# Patient Record
Sex: FEMALE | Race: WHITE | Employment: UNEMPLOYED | URBAN - NONMETROPOLITAN AREA
[De-identification: names, ages, dates, MRNs, and addresses within clinical notes are randomized per-mention and may not be internally consistent; named-entity substitution may affect disease eponyms.]

---

## 2019-10-30 ENCOUNTER — HOSPITAL ENCOUNTER (EMERGENCY)
Age: 29
Discharge: HOME | End: 2019-10-30
Attending: EMERGENCY MEDICINE
Payer: MEDICARE

## 2019-10-30 ENCOUNTER — APPOINTMENT (OUTPATIENT)
Dept: CT IMAGING | Age: 29
End: 2019-10-30
Payer: MEDICARE

## 2019-10-30 VITALS
HEART RATE: 63 BPM | WEIGHT: 293 LBS | BODY MASS INDEX: 45.99 KG/M2 | HEIGHT: 67 IN | TEMPERATURE: 99.1 F | SYSTOLIC BLOOD PRESSURE: 160 MMHG | DIASTOLIC BLOOD PRESSURE: 70 MMHG | RESPIRATION RATE: 16 BRPM | OXYGEN SATURATION: 96 %

## 2019-10-30 DIAGNOSIS — R11.2 NON-INTRACTABLE VOMITING WITH NAUSEA, UNSPECIFIED VOMITING TYPE: ICD-10-CM

## 2019-10-30 DIAGNOSIS — R10.9 ABDOMINAL PAIN, UNSPECIFIED ABDOMINAL LOCATION: Primary | ICD-10-CM

## 2019-10-30 LAB
-: ABNORMAL
ABSOLUTE EOS #: 0.1 K/UL (ref 0–0.4)
ABSOLUTE IMMATURE GRANULOCYTE: ABNORMAL K/UL (ref 0–0.3)
ABSOLUTE LYMPH #: 1.3 K/UL (ref 1–4.8)
ABSOLUTE MONO #: 1.3 K/UL (ref 0.1–1.2)
ALBUMIN SERPL-MCNC: 4 G/DL (ref 3.5–5.2)
ALBUMIN/GLOBULIN RATIO: 1.2 (ref 1–2.5)
ALP BLD-CCNC: 65 U/L (ref 35–104)
ALT SERPL-CCNC: <5 U/L (ref 5–33)
AMORPHOUS: ABNORMAL
ANION GAP SERPL CALCULATED.3IONS-SCNC: 11 MMOL/L (ref 9–17)
AST SERPL-CCNC: 11 U/L
BACTERIA: ABNORMAL
BASOPHILS # BLD: 0 % (ref 0–1)
BASOPHILS ABSOLUTE: 0 K/UL (ref 0–0.2)
BILIRUB SERPL-MCNC: 0.23 MG/DL (ref 0.3–1.2)
BILIRUBIN URINE: NEGATIVE
BUN BLDV-MCNC: 15 MG/DL (ref 6–20)
BUN/CREAT BLD: 25 (ref 9–20)
CALCIUM SERPL-MCNC: 9.3 MG/DL (ref 8.6–10.4)
CASTS UA: ABNORMAL /LPF (ref 0–2)
CHLORIDE BLD-SCNC: 102 MMOL/L (ref 98–107)
CO2: 28 MMOL/L (ref 20–31)
COLOR: ABNORMAL
COMMENT UA: ABNORMAL
CREAT SERPL-MCNC: 0.59 MG/DL (ref 0.5–0.9)
CRYSTALS, UA: ABNORMAL /HPF
DIFFERENTIAL TYPE: ABNORMAL
EOSINOPHILS RELATIVE PERCENT: 0 % (ref 1–7)
EPITHELIAL CELLS UA: >50 /HPF (ref 0–5)
GFR AFRICAN AMERICAN: >60 ML/MIN
GFR NON-AFRICAN AMERICAN: >60 ML/MIN
GFR SERPL CREATININE-BSD FRML MDRD: ABNORMAL ML/MIN/{1.73_M2}
GFR SERPL CREATININE-BSD FRML MDRD: ABNORMAL ML/MIN/{1.73_M2}
GLUCOSE BLD-MCNC: 124 MG/DL (ref 70–99)
GLUCOSE URINE: NEGATIVE
HCG(URINE) PREGNANCY TEST: NEGATIVE
HCT VFR BLD CALC: 28.8 % (ref 36–46)
HEMOGLOBIN: 9 G/DL (ref 12–16)
IMMATURE GRANULOCYTES: ABNORMAL %
KETONES, URINE: NEGATIVE
LEUKOCYTE ESTERASE, URINE: ABNORMAL
LIPASE: 34 U/L (ref 13–60)
LYMPHOCYTES # BLD: 8 % (ref 16–46)
MCH RBC QN AUTO: 25.3 PG (ref 26–34)
MCHC RBC AUTO-ENTMCNC: 31.5 G/DL (ref 31–37)
MCV RBC AUTO: 80.3 FL (ref 80–100)
MONOCYTES # BLD: 8 % (ref 4–11)
MUCUS: ABNORMAL
NITRITE, URINE: NEGATIVE
NRBC AUTOMATED: ABNORMAL PER 100 WBC
OTHER OBSERVATIONS UA: ABNORMAL
PDW BLD-RTO: 16.9 % (ref 11–14.5)
PH UA: 5.5 (ref 5–6)
PLATELET # BLD: 468 K/UL (ref 140–450)
PLATELET ESTIMATE: ABNORMAL
PMV BLD AUTO: 7.8 FL (ref 6–12)
POTASSIUM SERPL-SCNC: 4.1 MMOL/L (ref 3.7–5.3)
PROTEIN UA: NEGATIVE
RBC # BLD: 3.58 M/UL (ref 4–5.2)
RBC # BLD: ABNORMAL 10*6/UL
RBC UA: ABNORMAL /HPF (ref 0–4)
RENAL EPITHELIAL, UA: ABNORMAL /HPF
SEG NEUTROPHILS: 84 % (ref 43–77)
SEGMENTED NEUTROPHILS ABSOLUTE COUNT: 13.3 K/UL (ref 1.8–7.7)
SODIUM BLD-SCNC: 141 MMOL/L (ref 135–144)
SPECIFIC GRAVITY UA: 1.03 (ref 1.01–1.02)
TOTAL PROTEIN: 7.4 G/DL (ref 6.4–8.3)
TRICHOMONAS: ABNORMAL
TURBIDITY: ABNORMAL
URINE HGB: NEGATIVE
UROBILINOGEN, URINE: NORMAL
WBC # BLD: 16 K/UL (ref 3.5–11)
WBC # BLD: ABNORMAL 10*3/UL
WBC UA: ABNORMAL /HPF (ref 0–4)
YEAST: ABNORMAL

## 2019-10-30 PROCEDURE — 99284 EMERGENCY DEPT VISIT MOD MDM: CPT

## 2019-10-30 PROCEDURE — 6360000004 HC RX CONTRAST MEDICATION: Performed by: EMERGENCY MEDICINE

## 2019-10-30 PROCEDURE — 81001 URINALYSIS AUTO W/SCOPE: CPT

## 2019-10-30 PROCEDURE — 85025 COMPLETE CBC W/AUTO DIFF WBC: CPT

## 2019-10-30 PROCEDURE — 6360000002 HC RX W HCPCS: Performed by: EMERGENCY MEDICINE

## 2019-10-30 PROCEDURE — 96374 THER/PROPH/DIAG INJ IV PUSH: CPT

## 2019-10-30 PROCEDURE — 74177 CT ABD & PELVIS W/CONTRAST: CPT

## 2019-10-30 PROCEDURE — 96375 TX/PRO/DX INJ NEW DRUG ADDON: CPT

## 2019-10-30 PROCEDURE — 83690 ASSAY OF LIPASE: CPT

## 2019-10-30 PROCEDURE — 2580000003 HC RX 258: Performed by: EMERGENCY MEDICINE

## 2019-10-30 PROCEDURE — 6370000000 HC RX 637 (ALT 250 FOR IP): Performed by: EMERGENCY MEDICINE

## 2019-10-30 PROCEDURE — 81025 URINE PREGNANCY TEST: CPT

## 2019-10-30 PROCEDURE — 80053 COMPREHEN METABOLIC PANEL: CPT

## 2019-10-30 RX ORDER — ONDANSETRON 4 MG/1
4 TABLET, ORALLY DISINTEGRATING ORAL ONCE
Status: COMPLETED | OUTPATIENT
Start: 2019-10-30 | End: 2019-10-30

## 2019-10-30 RX ORDER — ONDANSETRON 2 MG/ML
4 INJECTION INTRAMUSCULAR; INTRAVENOUS ONCE
Status: COMPLETED | OUTPATIENT
Start: 2019-10-30 | End: 2019-10-30

## 2019-10-30 RX ORDER — ONDANSETRON 4 MG/1
4 TABLET, ORALLY DISINTEGRATING ORAL EVERY 8 HOURS PRN
Qty: 12 TABLET | Refills: 0 | Status: ON HOLD | OUTPATIENT
Start: 2019-10-30 | End: 2020-01-09 | Stop reason: HOSPADM

## 2019-10-30 RX ORDER — 0.9 % SODIUM CHLORIDE 0.9 %
1000 INTRAVENOUS SOLUTION INTRAVENOUS ONCE
Status: COMPLETED | OUTPATIENT
Start: 2019-10-30 | End: 2019-10-30

## 2019-10-30 RX ORDER — KETOROLAC TROMETHAMINE 30 MG/ML
15 INJECTION, SOLUTION INTRAMUSCULAR; INTRAVENOUS ONCE
Status: COMPLETED | OUTPATIENT
Start: 2019-10-30 | End: 2019-10-30

## 2019-10-30 RX ADMIN — IOPAMIDOL 100 ML: 755 INJECTION, SOLUTION INTRAVENOUS at 22:43

## 2019-10-30 RX ADMIN — SODIUM CHLORIDE 1000 ML: 9 INJECTION, SOLUTION INTRAVENOUS at 21:42

## 2019-10-30 RX ADMIN — ONDANSETRON 4 MG: 4 TABLET, ORALLY DISINTEGRATING ORAL at 21:16

## 2019-10-30 RX ADMIN — KETOROLAC TROMETHAMINE 15 MG: 30 INJECTION, SOLUTION INTRAMUSCULAR at 21:51

## 2019-10-30 RX ADMIN — ONDANSETRON 4 MG: 2 INJECTION INTRAMUSCULAR; INTRAVENOUS at 21:50

## 2019-10-30 ASSESSMENT — PAIN DESCRIPTION - FREQUENCY
FREQUENCY: CONTINUOUS
FREQUENCY: INTERMITTENT

## 2019-10-30 ASSESSMENT — PAIN DESCRIPTION - LOCATION
LOCATION: ABDOMEN
LOCATION: ABDOMEN

## 2019-10-30 ASSESSMENT — PAIN DESCRIPTION - PAIN TYPE
TYPE: ACUTE PAIN
TYPE: ACUTE PAIN

## 2019-10-30 ASSESSMENT — PAIN SCALES - GENERAL
PAINLEVEL_OUTOF10: 8
PAINLEVEL_OUTOF10: 5
PAINLEVEL_OUTOF10: 0

## 2019-10-30 ASSESSMENT — PAIN DESCRIPTION - ORIENTATION
ORIENTATION: UPPER
ORIENTATION: MID;UPPER

## 2019-10-30 ASSESSMENT — PAIN DESCRIPTION - DESCRIPTORS
DESCRIPTORS: CRAMPING
DESCRIPTORS: CRAMPING

## 2019-10-31 ASSESSMENT — ENCOUNTER SYMPTOMS
DIARRHEA: 0
SHORTNESS OF BREATH: 0
ABDOMINAL PAIN: 1
NAUSEA: 1
CONSTIPATION: 0
BLOOD IN STOOL: 0
COUGH: 0
BACK PAIN: 0
VOMITING: 1

## 2019-12-09 ENCOUNTER — APPOINTMENT (OUTPATIENT)
Dept: CT IMAGING | Age: 29
End: 2019-12-09
Payer: MEDICARE

## 2019-12-09 ENCOUNTER — HOSPITAL ENCOUNTER (EMERGENCY)
Age: 29
Discharge: HOME | End: 2019-12-09
Attending: EMERGENCY MEDICINE
Payer: MEDICARE

## 2019-12-09 VITALS
WEIGHT: 293 LBS | OXYGEN SATURATION: 96 % | RESPIRATION RATE: 16 BRPM | SYSTOLIC BLOOD PRESSURE: 140 MMHG | BODY MASS INDEX: 45.99 KG/M2 | DIASTOLIC BLOOD PRESSURE: 80 MMHG | HEART RATE: 90 BPM | TEMPERATURE: 99 F | HEIGHT: 67 IN

## 2019-12-09 DIAGNOSIS — R10.84 GENERALIZED ABDOMINAL PAIN: Primary | ICD-10-CM

## 2019-12-09 LAB
-: ABNORMAL
ABSOLUTE EOS #: 0.16 K/UL (ref 0–0.4)
ABSOLUTE IMMATURE GRANULOCYTE: ABNORMAL K/UL (ref 0–0.3)
ABSOLUTE LYMPH #: 0.95 K/UL (ref 1–4.8)
ABSOLUTE MONO #: 0.95 K/UL (ref 0.1–1.2)
ALBUMIN SERPL-MCNC: 3.9 G/DL (ref 3.5–5.2)
ALBUMIN/GLOBULIN RATIO: 1.4 (ref 1–2.5)
ALP BLD-CCNC: 69 U/L (ref 35–104)
ALT SERPL-CCNC: 20 U/L (ref 5–33)
AMORPHOUS: ABNORMAL
ANION GAP SERPL CALCULATED.3IONS-SCNC: 14 MMOL/L (ref 9–17)
AST SERPL-CCNC: 14 U/L
BACTERIA: ABNORMAL
BASOPHILS # BLD: 0 % (ref 0–1)
BASOPHILS ABSOLUTE: 0 K/UL (ref 0–0.2)
BILIRUB SERPL-MCNC: 0.14 MG/DL (ref 0.3–1.2)
BILIRUBIN URINE: NEGATIVE
BUN BLDV-MCNC: 10 MG/DL (ref 6–20)
BUN/CREAT BLD: 20 (ref 9–20)
CALCIUM SERPL-MCNC: 9 MG/DL (ref 8.6–10.4)
CASTS UA: ABNORMAL /LPF (ref 0–2)
CHLORIDE BLD-SCNC: 97 MMOL/L (ref 98–107)
CO2: 31 MMOL/L (ref 20–31)
COLOR: ABNORMAL
COMMENT UA: ABNORMAL
CREAT SERPL-MCNC: 0.5 MG/DL (ref 0.5–0.9)
CRYSTALS, UA: ABNORMAL /HPF
DIFFERENTIAL TYPE: ABNORMAL
DIRECT EXAM: NORMAL
DIRECT EXAM: NORMAL
EOSINOPHILS RELATIVE PERCENT: 1 % (ref 1–7)
EPITHELIAL CELLS UA: ABNORMAL /HPF (ref 0–5)
GFR AFRICAN AMERICAN: >60 ML/MIN
GFR NON-AFRICAN AMERICAN: >60 ML/MIN
GFR SERPL CREATININE-BSD FRML MDRD: ABNORMAL ML/MIN/{1.73_M2}
GFR SERPL CREATININE-BSD FRML MDRD: ABNORMAL ML/MIN/{1.73_M2}
GLUCOSE BLD-MCNC: 139 MG/DL (ref 70–99)
GLUCOSE URINE: NEGATIVE
HCG QUALITATIVE: NEGATIVE
HCT VFR BLD CALC: 36.1 % (ref 36–46)
HEMOGLOBIN: 10.8 G/DL (ref 12–16)
IMMATURE GRANULOCYTES: ABNORMAL %
KETONES, URINE: NEGATIVE
LEUKOCYTE ESTERASE, URINE: ABNORMAL
LIPASE: 70 U/L (ref 13–60)
LYMPHOCYTES # BLD: 6 % (ref 16–46)
Lab: NORMAL
Lab: NORMAL
MCH RBC QN AUTO: 21.9 PG (ref 26–34)
MCHC RBC AUTO-ENTMCNC: 30 G/DL (ref 31–37)
MCV RBC AUTO: 72.9 FL (ref 80–100)
MONOCYTES # BLD: 6 % (ref 4–11)
MORPHOLOGY: ABNORMAL
MUCUS: ABNORMAL
NITRITE, URINE: NEGATIVE
NRBC AUTOMATED: ABNORMAL PER 100 WBC
OTHER OBSERVATIONS UA: ABNORMAL
PDW BLD-RTO: 19.9 % (ref 11–14.5)
PH UA: 7.5 (ref 5–6)
PLATELET # BLD: 398 K/UL (ref 140–450)
PLATELET ESTIMATE: ABNORMAL
PMV BLD AUTO: 8.2 FL (ref 6–12)
POTASSIUM SERPL-SCNC: 3.3 MMOL/L (ref 3.7–5.3)
PROTEIN UA: NEGATIVE
RBC # BLD: 4.96 M/UL (ref 4–5.2)
RBC # BLD: ABNORMAL 10*6/UL
RBC UA: ABNORMAL /HPF (ref 0–4)
RENAL EPITHELIAL, UA: ABNORMAL /HPF
SEG NEUTROPHILS: 87 % (ref 43–77)
SEGMENTED NEUTROPHILS ABSOLUTE COUNT: 13.74 K/UL (ref 1.8–7.7)
SODIUM BLD-SCNC: 142 MMOL/L (ref 135–144)
SPECIFIC GRAVITY UA: 1.02 (ref 1.01–1.02)
SPECIMEN DESCRIPTION: NORMAL
SPECIMEN DESCRIPTION: NORMAL
TOTAL PROTEIN: 6.7 G/DL (ref 6.4–8.3)
TRICHOMONAS: ABNORMAL
TURBIDITY: ABNORMAL
URINE HGB: NEGATIVE
UROBILINOGEN, URINE: NORMAL
WBC # BLD: 15.8 K/UL (ref 3.5–11)
WBC # BLD: ABNORMAL 10*3/UL
WBC UA: ABNORMAL /HPF (ref 0–4)
YEAST: ABNORMAL

## 2019-12-09 PROCEDURE — 84703 CHORIONIC GONADOTROPIN ASSAY: CPT

## 2019-12-09 PROCEDURE — 85025 COMPLETE CBC W/AUTO DIFF WBC: CPT

## 2019-12-09 PROCEDURE — 74176 CT ABD & PELVIS W/O CONTRAST: CPT

## 2019-12-09 PROCEDURE — 87491 CHLMYD TRACH DNA AMP PROBE: CPT

## 2019-12-09 PROCEDURE — 83690 ASSAY OF LIPASE: CPT

## 2019-12-09 PROCEDURE — 87591 N.GONORRHOEAE DNA AMP PROB: CPT

## 2019-12-09 PROCEDURE — 87086 URINE CULTURE/COLONY COUNT: CPT

## 2019-12-09 PROCEDURE — 87210 SMEAR WET MOUNT SALINE/INK: CPT

## 2019-12-09 PROCEDURE — 80053 COMPREHEN METABOLIC PANEL: CPT

## 2019-12-09 PROCEDURE — 81001 URINALYSIS AUTO W/SCOPE: CPT

## 2019-12-09 PROCEDURE — 6360000002 HC RX W HCPCS: Performed by: EMERGENCY MEDICINE

## 2019-12-09 PROCEDURE — 96374 THER/PROPH/DIAG INJ IV PUSH: CPT

## 2019-12-09 PROCEDURE — 99284 EMERGENCY DEPT VISIT MOD MDM: CPT

## 2019-12-09 RX ORDER — PNV NO.95/FERROUS FUM/FOLIC AC 28MG-0.8MG
325 TABLET ORAL
COMMUNITY
Start: 2019-11-26 | End: 2020-01-06

## 2019-12-09 RX ORDER — AMLODIPINE BESYLATE 5 MG/1
5 TABLET ORAL
Status: ON HOLD | COMMUNITY
Start: 2019-12-02 | End: 2020-01-09 | Stop reason: HOSPADM

## 2019-12-09 RX ORDER — HYDROCHLOROTHIAZIDE 12.5 MG/1
12.5 CAPSULE, GELATIN COATED ORAL
Status: ON HOLD | COMMUNITY
Start: 2019-11-19 | End: 2020-01-09 | Stop reason: HOSPADM

## 2019-12-09 RX ORDER — CYCLOBENZAPRINE HCL 10 MG
10 TABLET ORAL 3 TIMES DAILY PRN
COMMUNITY

## 2019-12-09 RX ORDER — KETOROLAC TROMETHAMINE 30 MG/ML
30 INJECTION, SOLUTION INTRAMUSCULAR; INTRAVENOUS ONCE
Status: COMPLETED | OUTPATIENT
Start: 2019-12-09 | End: 2019-12-09

## 2019-12-09 RX ORDER — LOSARTAN POTASSIUM 100 MG/1
100 TABLET ORAL
COMMUNITY
Start: 2019-11-26

## 2019-12-09 RX ADMIN — KETOROLAC TROMETHAMINE 30 MG: 30 INJECTION, SOLUTION INTRAMUSCULAR at 20:47

## 2019-12-09 ASSESSMENT — PAIN SCALES - GENERAL
PAINLEVEL_OUTOF10: 9
PAINLEVEL_OUTOF10: 5
PAINLEVEL_OUTOF10: 9

## 2019-12-09 ASSESSMENT — PAIN DESCRIPTION - DESCRIPTORS: DESCRIPTORS: BURNING;CRAMPING;SHARP

## 2019-12-09 ASSESSMENT — ENCOUNTER SYMPTOMS
SHORTNESS OF BREATH: 0
NAUSEA: 0
DIARRHEA: 0
BACK PAIN: 0
EYE PAIN: 0
COUGH: 0
ABDOMINAL PAIN: 1
VOMITING: 0

## 2019-12-09 ASSESSMENT — PAIN DESCRIPTION - ONSET: ONSET: ON-GOING

## 2019-12-09 ASSESSMENT — PAIN DESCRIPTION - LOCATION: LOCATION: ABDOMEN

## 2019-12-09 ASSESSMENT — PAIN DESCRIPTION - ORIENTATION: ORIENTATION: LOWER

## 2019-12-09 ASSESSMENT — PAIN - FUNCTIONAL ASSESSMENT: PAIN_FUNCTIONAL_ASSESSMENT: 0-10

## 2019-12-09 ASSESSMENT — PAIN DESCRIPTION - PAIN TYPE: TYPE: CHRONIC PAIN

## 2019-12-09 ASSESSMENT — PAIN DESCRIPTION - PROGRESSION: CLINICAL_PROGRESSION: NOT CHANGED

## 2019-12-09 ASSESSMENT — PAIN DESCRIPTION - FREQUENCY: FREQUENCY: INTERMITTENT

## 2019-12-11 LAB
C TRACH DNA GENITAL QL NAA+PROBE: NEGATIVE
N. GONORRHOEAE DNA: NEGATIVE
SPECIMEN DESCRIPTION: NORMAL

## 2019-12-12 LAB
-: NORMAL
REASON FOR REJECTION: NORMAL
ZZ NTE CLEAN UP: ORDERED TEST: NORMAL
ZZ NTE WITH NAME CLEAN UP: SPECIMEN SOURCE: NORMAL

## 2019-12-16 ENCOUNTER — HOSPITAL ENCOUNTER (EMERGENCY)
Age: 29
Discharge: HOME | End: 2019-12-16
Attending: EMERGENCY MEDICINE
Payer: MEDICARE

## 2019-12-16 VITALS
WEIGHT: 293 LBS | HEART RATE: 77 BPM | TEMPERATURE: 98.8 F | HEIGHT: 67 IN | DIASTOLIC BLOOD PRESSURE: 84 MMHG | BODY MASS INDEX: 45.99 KG/M2 | OXYGEN SATURATION: 95 % | SYSTOLIC BLOOD PRESSURE: 166 MMHG | RESPIRATION RATE: 12 BRPM

## 2019-12-16 DIAGNOSIS — E87.6 HYPOKALEMIA: ICD-10-CM

## 2019-12-16 DIAGNOSIS — R11.2 NAUSEA VOMITING AND DIARRHEA: Primary | ICD-10-CM

## 2019-12-16 DIAGNOSIS — E86.0 DEHYDRATION: ICD-10-CM

## 2019-12-16 DIAGNOSIS — R19.7 NAUSEA VOMITING AND DIARRHEA: Primary | ICD-10-CM

## 2019-12-16 LAB
ABSOLUTE EOS #: 0 K/UL (ref 0–0.4)
ABSOLUTE IMMATURE GRANULOCYTE: ABNORMAL K/UL (ref 0–0.3)
ABSOLUTE LYMPH #: 0.4 K/UL (ref 1–4.8)
ABSOLUTE MONO #: 0.4 K/UL (ref 0.1–1.2)
ALBUMIN SERPL-MCNC: 3.4 G/DL (ref 3.5–5.2)
ALBUMIN/GLOBULIN RATIO: 1.3 (ref 1–2.5)
ALP BLD-CCNC: 57 U/L (ref 35–104)
ALT SERPL-CCNC: 19 U/L (ref 5–33)
AMYLASE: 60 U/L (ref 28–100)
ANION GAP SERPL CALCULATED.3IONS-SCNC: 12 MMOL/L (ref 9–17)
AST SERPL-CCNC: 18 U/L
BASOPHILS # BLD: 0 % (ref 0–1)
BASOPHILS ABSOLUTE: 0 K/UL (ref 0–0.2)
BILIRUB SERPL-MCNC: 0.46 MG/DL (ref 0.3–1.2)
BILIRUBIN DIRECT: 0.13 MG/DL
BILIRUBIN, INDIRECT: 0.33 MG/DL (ref 0–1)
BUN BLDV-MCNC: 13 MG/DL (ref 6–20)
BUN/CREAT BLD: 28 (ref 9–20)
CALCIUM SERPL-MCNC: 8.8 MG/DL (ref 8.6–10.4)
CHLORIDE BLD-SCNC: 96 MMOL/L (ref 98–107)
CO2: 34 MMOL/L (ref 20–31)
CREAT SERPL-MCNC: 0.46 MG/DL (ref 0.5–0.9)
DIFFERENTIAL TYPE: ABNORMAL
EOSINOPHILS RELATIVE PERCENT: 0 % (ref 1–7)
GFR AFRICAN AMERICAN: >60 ML/MIN
GFR NON-AFRICAN AMERICAN: >60 ML/MIN
GFR SERPL CREATININE-BSD FRML MDRD: ABNORMAL ML/MIN/{1.73_M2}
GFR SERPL CREATININE-BSD FRML MDRD: ABNORMAL ML/MIN/{1.73_M2}
GLOBULIN: 2.6 G/DL (ref 1.5–3.8)
GLUCOSE BLD-MCNC: 159 MG/DL (ref 70–99)
HCG QUALITATIVE: NEGATIVE
HCT VFR BLD CALC: 35.9 % (ref 36–46)
HEMOGLOBIN: 11 G/DL (ref 12–16)
IMMATURE GRANULOCYTES: ABNORMAL %
LIPASE: 34 U/L (ref 13–60)
LYMPHOCYTES # BLD: 4 % (ref 16–46)
MCH RBC QN AUTO: 22.6 PG (ref 26–34)
MCHC RBC AUTO-ENTMCNC: 30.7 G/DL (ref 31–37)
MCV RBC AUTO: 73.6 FL (ref 80–100)
MONOCYTES # BLD: 4 % (ref 4–11)
MORPHOLOGY: ABNORMAL
MORPHOLOGY: ABNORMAL
NRBC AUTOMATED: ABNORMAL PER 100 WBC
PDW BLD-RTO: 20.7 % (ref 11–14.5)
PLATELET # BLD: 294 K/UL (ref 140–450)
PLATELET ESTIMATE: ABNORMAL
PMV BLD AUTO: 8.2 FL (ref 6–12)
POTASSIUM SERPL-SCNC: 2.9 MMOL/L (ref 3.7–5.3)
RBC # BLD: 4.88 M/UL (ref 4–5.2)
RBC # BLD: ABNORMAL 10*6/UL
SEG NEUTROPHILS: 92 % (ref 43–77)
SEGMENTED NEUTROPHILS ABSOLUTE COUNT: 9.3 K/UL (ref 1.8–7.7)
SODIUM BLD-SCNC: 142 MMOL/L (ref 135–144)
TOTAL PROTEIN: 6 G/DL (ref 6.4–8.3)
WBC # BLD: 10.1 K/UL (ref 3.5–11)
WBC # BLD: ABNORMAL 10*3/UL

## 2019-12-16 PROCEDURE — 80048 BASIC METABOLIC PNL TOTAL CA: CPT

## 2019-12-16 PROCEDURE — 80076 HEPATIC FUNCTION PANEL: CPT

## 2019-12-16 PROCEDURE — 99284 EMERGENCY DEPT VISIT MOD MDM: CPT

## 2019-12-16 PROCEDURE — 2580000003 HC RX 258: Performed by: EMERGENCY MEDICINE

## 2019-12-16 PROCEDURE — 84703 CHORIONIC GONADOTROPIN ASSAY: CPT

## 2019-12-16 PROCEDURE — 96361 HYDRATE IV INFUSION ADD-ON: CPT

## 2019-12-16 PROCEDURE — 36415 COLL VENOUS BLD VENIPUNCTURE: CPT

## 2019-12-16 PROCEDURE — 85025 COMPLETE CBC W/AUTO DIFF WBC: CPT

## 2019-12-16 PROCEDURE — 82150 ASSAY OF AMYLASE: CPT

## 2019-12-16 PROCEDURE — 83690 ASSAY OF LIPASE: CPT

## 2019-12-16 PROCEDURE — 96374 THER/PROPH/DIAG INJ IV PUSH: CPT

## 2019-12-16 PROCEDURE — 6360000002 HC RX W HCPCS: Performed by: EMERGENCY MEDICINE

## 2019-12-16 RX ORDER — 0.9 % SODIUM CHLORIDE 0.9 %
1000 INTRAVENOUS SOLUTION INTRAVENOUS ONCE
Status: COMPLETED | OUTPATIENT
Start: 2019-12-16 | End: 2019-12-16

## 2019-12-16 RX ORDER — POTASSIUM CHLORIDE 20 MEQ/1
20 TABLET, EXTENDED RELEASE ORAL DAILY
Qty: 10 TABLET | Refills: 0 | Status: SHIPPED | OUTPATIENT
Start: 2019-12-16

## 2019-12-16 RX ORDER — ONDANSETRON 2 MG/ML
4 INJECTION INTRAMUSCULAR; INTRAVENOUS ONCE
Status: COMPLETED | OUTPATIENT
Start: 2019-12-16 | End: 2019-12-16

## 2019-12-16 RX ORDER — ONDANSETRON 4 MG/1
4 TABLET, FILM COATED ORAL EVERY 8 HOURS PRN
Qty: 10 TABLET | Refills: 0 | Status: SHIPPED | OUTPATIENT
Start: 2019-12-16

## 2019-12-16 RX ADMIN — ONDANSETRON 4 MG: 2 INJECTION INTRAMUSCULAR; INTRAVENOUS at 17:35

## 2019-12-16 RX ADMIN — SODIUM CHLORIDE 1000 ML: 9 INJECTION, SOLUTION INTRAVENOUS at 17:35

## 2020-01-06 ENCOUNTER — HOSPITAL ENCOUNTER (INPATIENT)
Age: 30
LOS: 3 days | Discharge: HOME | DRG: 689 | End: 2020-01-09
Attending: EMERGENCY MEDICINE | Admitting: FAMILY MEDICINE
Payer: COMMERCIAL

## 2020-01-06 PROBLEM — N39.0 UTI (URINARY TRACT INFECTION): Status: ACTIVE | Noted: 2020-01-06

## 2020-01-06 LAB
-: ABNORMAL
ABSOLUTE EOS #: 0 K/UL (ref 0–0.44)
ABSOLUTE IMMATURE GRANULOCYTE: 0.25 K/UL (ref 0–0.3)
ABSOLUTE LYMPH #: 0.38 K/UL (ref 1.1–3.7)
ABSOLUTE MONO #: 0.04 K/UL (ref 0.1–1.2)
ALBUMIN SERPL-MCNC: 3.7 G/DL (ref 3.5–5.2)
ALBUMIN/GLOBULIN RATIO: 1.6 (ref 1–2.5)
ALP BLD-CCNC: 62 U/L (ref 35–104)
ALT SERPL-CCNC: 52 U/L (ref 5–33)
AMORPHOUS: ABNORMAL
ANION GAP SERPL CALCULATED.3IONS-SCNC: 19 MMOL/L (ref 9–17)
AST SERPL-CCNC: 15 U/L
BACTERIA: ABNORMAL
BASOPHILS # BLD: 1 % (ref 0–2)
BASOPHILS ABSOLUTE: 0.04 K/UL (ref 0–0.2)
BILIRUB SERPL-MCNC: 0.51 MG/DL (ref 0.3–1.2)
BILIRUBIN URINE: NEGATIVE
BUN BLDV-MCNC: 24 MG/DL (ref 6–20)
BUN/CREAT BLD: 38 (ref 9–20)
CALCIUM SERPL-MCNC: 8.7 MG/DL (ref 8.6–10.4)
CASTS UA: ABNORMAL /LPF (ref 0–2)
CHLORIDE BLD-SCNC: 93 MMOL/L (ref 98–107)
CO2: 30 MMOL/L (ref 20–31)
COLOR: ABNORMAL
COMMENT UA: ABNORMAL
CREAT SERPL-MCNC: 0.64 MG/DL (ref 0.5–0.9)
CRYSTALS, UA: ABNORMAL /HPF
DIFFERENTIAL TYPE: ABNORMAL
EKG ATRIAL RATE: 125 BPM
EKG P AXIS: 50 DEGREES
EKG P-R INTERVAL: 130 MS
EKG Q-T INTERVAL: 294 MS
EKG QRS DURATION: 78 MS
EKG QTC CALCULATION (BAZETT): 424 MS
EKG R AXIS: 25 DEGREES
EKG T AXIS: -56 DEGREES
EKG VENTRICULAR RATE: 125 BPM
EOSINOPHILS RELATIVE PERCENT: 0 % (ref 1–4)
EPITHELIAL CELLS UA: ABNORMAL /HPF (ref 0–5)
ESTIMATED AVERAGE GLUCOSE: 166 MG/DL
GFR AFRICAN AMERICAN: >60 ML/MIN
GFR NON-AFRICAN AMERICAN: >60 ML/MIN
GFR SERPL CREATININE-BSD FRML MDRD: ABNORMAL ML/MIN/{1.73_M2}
GFR SERPL CREATININE-BSD FRML MDRD: ABNORMAL ML/MIN/{1.73_M2}
GLUCOSE BLD-MCNC: 210 MG/DL (ref 65–105)
GLUCOSE BLD-MCNC: 230 MG/DL (ref 65–105)
GLUCOSE BLD-MCNC: 310 MG/DL (ref 65–105)
GLUCOSE BLD-MCNC: 346 MG/DL (ref 70–99)
GLUCOSE URINE: ABNORMAL
HBA1C MFR BLD: 7.4 % (ref 4.8–5.9)
HCG QUALITATIVE: NEGATIVE
HCT VFR BLD CALC: 40 % (ref 36.3–47.1)
HEMOGLOBIN: 11.2 G/DL (ref 11.9–15.1)
IMMATURE GRANULOCYTES: 6 %
KETONES, URINE: ABNORMAL
LACTIC ACID, SEPSIS WHOLE BLOOD: ABNORMAL MMOL/L (ref 0.5–1.9)
LACTIC ACID, SEPSIS: 5.6 MMOL/L (ref 0.5–1.9)
LACTIC ACID: 3.9 MMOL/L (ref 0.5–2.2)
LACTIC ACID: 4.6 MMOL/L (ref 0.5–2.2)
LEUKOCYTE ESTERASE, URINE: ABNORMAL
LYMPHOCYTES # BLD: 9 % (ref 24–43)
MCH RBC QN AUTO: 21.7 PG (ref 25.2–33.5)
MCHC RBC AUTO-ENTMCNC: 28 G/DL (ref 25.2–33.5)
MCV RBC AUTO: 77.4 FL (ref 82.6–102.9)
MONOCYTES # BLD: 1 % (ref 3–12)
MORPHOLOGY: ABNORMAL
MUCUS: ABNORMAL
NITRITE, URINE: NEGATIVE
NRBC AUTOMATED: 0 PER 100 WBC
OTHER OBSERVATIONS UA: ABNORMAL
PDW BLD-RTO: 19.3 % (ref 11.8–14.4)
PH UA: 6 (ref 5–6)
PLATELET # BLD: 134 K/UL (ref 138–453)
PLATELET ESTIMATE: ABNORMAL
PMV BLD AUTO: 9.9 FL (ref 8.1–13.5)
POTASSIUM SERPL-SCNC: 3.3 MMOL/L (ref 3.7–5.3)
PROTEIN UA: ABNORMAL
RBC # BLD: 5.17 M/UL (ref 3.95–5.11)
RBC # BLD: ABNORMAL 10*6/UL
RBC UA: ABNORMAL /HPF (ref 0–4)
RENAL EPITHELIAL, UA: ABNORMAL /HPF
SEG NEUTROPHILS: 83 % (ref 36–65)
SEGMENTED NEUTROPHILS ABSOLUTE COUNT: 3.49 K/UL (ref 1.5–8.1)
SODIUM BLD-SCNC: 142 MMOL/L (ref 135–144)
SPECIFIC GRAVITY UA: 1.02 (ref 1.01–1.02)
TOTAL PROTEIN: 6 G/DL (ref 6.4–8.3)
TRICHOMONAS: ABNORMAL
TURBIDITY: ABNORMAL
URINE HGB: ABNORMAL
UROBILINOGEN, URINE: NORMAL
WBC # BLD: 4.2 K/UL (ref 3.5–11.3)
WBC # BLD: ABNORMAL 10*3/UL
WBC UA: ABNORMAL /HPF (ref 0–4)
YEAST: ABNORMAL

## 2020-01-06 PROCEDURE — 87040 BLOOD CULTURE FOR BACTERIA: CPT

## 2020-01-06 PROCEDURE — 99285 EMERGENCY DEPT VISIT HI MDM: CPT

## 2020-01-06 PROCEDURE — 83605 ASSAY OF LACTIC ACID: CPT

## 2020-01-06 PROCEDURE — 85025 COMPLETE CBC W/AUTO DIFF WBC: CPT

## 2020-01-06 PROCEDURE — 2580000003 HC RX 258: Performed by: NURSE PRACTITIONER

## 2020-01-06 PROCEDURE — 6370000000 HC RX 637 (ALT 250 FOR IP): Performed by: EMERGENCY MEDICINE

## 2020-01-06 PROCEDURE — 6360000002 HC RX W HCPCS: Performed by: EMERGENCY MEDICINE

## 2020-01-06 PROCEDURE — 36415 COLL VENOUS BLD VENIPUNCTURE: CPT

## 2020-01-06 PROCEDURE — 93005 ELECTROCARDIOGRAM TRACING: CPT | Performed by: EMERGENCY MEDICINE

## 2020-01-06 PROCEDURE — 2500000003 HC RX 250 WO HCPCS: Performed by: NURSE PRACTITIONER

## 2020-01-06 PROCEDURE — 82947 ASSAY GLUCOSE BLOOD QUANT: CPT

## 2020-01-06 PROCEDURE — 81001 URINALYSIS AUTO W/SCOPE: CPT

## 2020-01-06 PROCEDURE — 80053 COMPREHEN METABOLIC PANEL: CPT

## 2020-01-06 PROCEDURE — 2060000000 HC ICU INTERMEDIATE R&B

## 2020-01-06 PROCEDURE — 96365 THER/PROPH/DIAG IV INF INIT: CPT

## 2020-01-06 PROCEDURE — 6370000000 HC RX 637 (ALT 250 FOR IP): Performed by: NURSE PRACTITIONER

## 2020-01-06 PROCEDURE — 96375 TX/PRO/DX INJ NEW DRUG ADDON: CPT

## 2020-01-06 PROCEDURE — 83036 HEMOGLOBIN GLYCOSYLATED A1C: CPT

## 2020-01-06 PROCEDURE — 2580000003 HC RX 258: Performed by: EMERGENCY MEDICINE

## 2020-01-06 PROCEDURE — 87086 URINE CULTURE/COLONY COUNT: CPT

## 2020-01-06 PROCEDURE — 84703 CHORIONIC GONADOTROPIN ASSAY: CPT

## 2020-01-06 RX ORDER — LORAZEPAM 1 MG/1
1 TABLET ORAL EVERY 6 HOURS PRN
COMMUNITY

## 2020-01-06 RX ORDER — 0.9 % SODIUM CHLORIDE 0.9 %
1000 INTRAVENOUS SOLUTION INTRAVENOUS ONCE
Status: COMPLETED | OUTPATIENT
Start: 2020-01-06 | End: 2020-01-06

## 2020-01-06 RX ORDER — SODIUM CHLORIDE 0.9 % (FLUSH) 0.9 %
10 SYRINGE (ML) INJECTION EVERY 12 HOURS SCHEDULED
Status: DISCONTINUED | OUTPATIENT
Start: 2020-01-06 | End: 2020-01-09 | Stop reason: HOSPADM

## 2020-01-06 RX ORDER — DEXTROSE MONOHYDRATE 25 G/50ML
12.5 INJECTION, SOLUTION INTRAVENOUS PRN
Status: DISCONTINUED | OUTPATIENT
Start: 2020-01-06 | End: 2020-01-09 | Stop reason: HOSPADM

## 2020-01-06 RX ORDER — DEXTROSE MONOHYDRATE 50 MG/ML
100 INJECTION, SOLUTION INTRAVENOUS PRN
Status: DISCONTINUED | OUTPATIENT
Start: 2020-01-06 | End: 2020-01-09 | Stop reason: HOSPADM

## 2020-01-06 RX ORDER — NICOTINE POLACRILEX 4 MG
15 LOZENGE BUCCAL PRN
Status: DISCONTINUED | OUTPATIENT
Start: 2020-01-06 | End: 2020-01-09 | Stop reason: HOSPADM

## 2020-01-06 RX ORDER — AMLODIPINE BESYLATE 5 MG/1
5 TABLET ORAL NIGHTLY
Status: DISCONTINUED | OUTPATIENT
Start: 2020-01-06 | End: 2020-01-09

## 2020-01-06 RX ORDER — ONDANSETRON 2 MG/ML
4 INJECTION INTRAMUSCULAR; INTRAVENOUS ONCE
Status: COMPLETED | OUTPATIENT
Start: 2020-01-06 | End: 2020-01-06

## 2020-01-06 RX ORDER — ACETAMINOPHEN 325 MG/1
650 TABLET ORAL EVERY 4 HOURS PRN
Status: DISCONTINUED | OUTPATIENT
Start: 2020-01-06 | End: 2020-01-09 | Stop reason: HOSPADM

## 2020-01-06 RX ORDER — INSULIN GLARGINE 100 [IU]/ML
20 INJECTION, SOLUTION SUBCUTANEOUS NIGHTLY
Status: DISCONTINUED | OUTPATIENT
Start: 2020-01-06 | End: 2020-01-08

## 2020-01-06 RX ORDER — SPIRONOLACTONE AND HYDROCHLOROTHIAZIDE 25; 25 MG/1; MG/1
1 TABLET ORAL DAILY
COMMUNITY

## 2020-01-06 RX ORDER — SODIUM CHLORIDE 9 MG/ML
INJECTION, SOLUTION INTRAVENOUS CONTINUOUS
Status: DISCONTINUED | OUTPATIENT
Start: 2020-01-06 | End: 2020-01-08

## 2020-01-06 RX ORDER — SODIUM CHLORIDE 0.9 % (FLUSH) 0.9 %
10 SYRINGE (ML) INJECTION PRN
Status: DISCONTINUED | OUTPATIENT
Start: 2020-01-06 | End: 2020-01-09 | Stop reason: HOSPADM

## 2020-01-06 RX ORDER — PROCHLORPERAZINE MALEATE 10 MG
10 TABLET ORAL EVERY 6 HOURS PRN
COMMUNITY

## 2020-01-06 RX ORDER — TRAMADOL HYDROCHLORIDE 50 MG/1
50 TABLET ORAL EVERY 6 HOURS PRN
COMMUNITY
End: 2021-01-24 | Stop reason: ALTCHOICE

## 2020-01-06 RX ORDER — DEXAMETHASONE 4 MG/1
4 TABLET ORAL 2 TIMES DAILY WITH MEALS
COMMUNITY

## 2020-01-06 RX ORDER — METOPROLOL TARTRATE 5 MG/5ML
5 INJECTION INTRAVENOUS EVERY 4 HOURS PRN
Status: DISCONTINUED | OUTPATIENT
Start: 2020-01-06 | End: 2020-01-09 | Stop reason: HOSPADM

## 2020-01-06 RX ADMIN — SODIUM CHLORIDE 1000 ML: 9 INJECTION, SOLUTION INTRAVENOUS at 22:34

## 2020-01-06 RX ADMIN — Medication 10 ML: at 22:02

## 2020-01-06 RX ADMIN — CEFTRIAXONE 1 G: 1 INJECTION, POWDER, FOR SOLUTION INTRAMUSCULAR; INTRAVENOUS at 15:00

## 2020-01-06 RX ADMIN — SODIUM CHLORIDE 1000 ML: 9 INJECTION, SOLUTION INTRAVENOUS at 08:29

## 2020-01-06 RX ADMIN — SODIUM CHLORIDE: 9 INJECTION, SOLUTION INTRAVENOUS at 22:01

## 2020-01-06 RX ADMIN — METOPROLOL TARTRATE 5 MG: 1 INJECTION, SOLUTION INTRAVENOUS at 23:16

## 2020-01-06 RX ADMIN — ONDANSETRON 4 MG: 2 INJECTION INTRAMUSCULAR; INTRAVENOUS at 08:49

## 2020-01-06 RX ADMIN — METOPROLOL TARTRATE 25 MG: 25 TABLET ORAL at 22:00

## 2020-01-06 RX ADMIN — AMLODIPINE BESYLATE 5 MG: 5 TABLET ORAL at 22:00

## 2020-01-06 RX ADMIN — SODIUM CHLORIDE 1000 ML: 9 INJECTION, SOLUTION INTRAVENOUS at 15:00

## 2020-01-06 RX ADMIN — INSULIN LISPRO 2 UNITS: 100 INJECTION, SOLUTION INTRAVENOUS; SUBCUTANEOUS at 22:13

## 2020-01-06 RX ADMIN — INSULIN GLARGINE 20 UNITS: 100 INJECTION, SOLUTION SUBCUTANEOUS at 22:13

## 2020-01-06 RX ADMIN — LIDOCAINE HYDROCHLORIDE: 20 SOLUTION ORAL; TOPICAL at 08:52

## 2020-01-06 RX ADMIN — SODIUM CHLORIDE 1000 ML: 9 INJECTION, SOLUTION INTRAVENOUS at 12:06

## 2020-01-06 ASSESSMENT — ENCOUNTER SYMPTOMS
ABDOMINAL PAIN: 0
CONSTIPATION: 0
SHORTNESS OF BREATH: 0
VOMITING: 0
BLOOD IN STOOL: 0
BACK PAIN: 0
DIARRHEA: 0
COUGH: 0
NAUSEA: 1
EYE PAIN: 0

## 2020-01-06 ASSESSMENT — PAIN SCALES - GENERAL
PAINLEVEL_OUTOF10: 0

## 2020-01-06 NOTE — ED NOTES
Patient taken to bathroom via wheelchair and brought back to room. Patient independent in transferring and toileting. Back to room patient states she is feeling better but continues to feel jittery. Patient positions self for comfort on cart. Side rails are returned to being up x2, call light in reach.      Imani Hui RN  01/06/20 [id number]

## 2020-01-06 NOTE — ED NOTES
Patient requests and is given a cup with mostly ice and water and a cup of ice. Diabetic meal tray is ordered from dietary.      Nickolas Ocasio RN  01/06/20 8421

## 2020-01-07 PROBLEM — E66.01 MORBID OBESITY WITH BMI OF 45.0-49.9, ADULT (HCC): Status: ACTIVE | Noted: 2020-01-07

## 2020-01-07 PROBLEM — F41.9 ANXIETY: Status: ACTIVE | Noted: 2020-01-07

## 2020-01-07 PROBLEM — C53.9 MALIGNANT NEOPLASM OF CERVIX (HCC): Status: ACTIVE | Noted: 2020-01-07

## 2020-01-07 PROBLEM — Z72.0 TOBACCO USE: Status: ACTIVE | Noted: 2020-01-07

## 2020-01-07 PROBLEM — D64.9 ANEMIA: Status: ACTIVE | Noted: 2020-01-07

## 2020-01-07 LAB
ALBUMIN SERPL-MCNC: 3.1 G/DL (ref 3.5–5.2)
ALBUMIN/GLOBULIN RATIO: 1.9 (ref 1–2.5)
ALP BLD-CCNC: 44 U/L (ref 35–104)
ALT SERPL-CCNC: 37 U/L (ref 5–33)
ANION GAP SERPL CALCULATED.3IONS-SCNC: 11 MMOL/L (ref 9–17)
AST SERPL-CCNC: 10 U/L
BILIRUB SERPL-MCNC: 0.22 MG/DL (ref 0.3–1.2)
BUN BLDV-MCNC: 15 MG/DL (ref 6–20)
BUN/CREAT BLD: 31 (ref 9–20)
CALCIUM IONIZED: 1.11 MMOL/L (ref 1.13–1.33)
CALCIUM SERPL-MCNC: 7.8 MG/DL (ref 8.6–10.4)
CHLORIDE BLD-SCNC: 102 MMOL/L (ref 98–107)
CO2: 30 MMOL/L (ref 20–31)
CREAT SERPL-MCNC: 0.49 MG/DL (ref 0.5–0.9)
GFR AFRICAN AMERICAN: >60 ML/MIN
GFR NON-AFRICAN AMERICAN: >60 ML/MIN
GFR SERPL CREATININE-BSD FRML MDRD: ABNORMAL ML/MIN/{1.73_M2}
GFR SERPL CREATININE-BSD FRML MDRD: ABNORMAL ML/MIN/{1.73_M2}
GLUCOSE BLD-MCNC: 212 MG/DL (ref 65–105)
GLUCOSE BLD-MCNC: 222 MG/DL (ref 70–99)
GLUCOSE BLD-MCNC: 229 MG/DL (ref 65–105)
GLUCOSE BLD-MCNC: 231 MG/DL (ref 65–105)
GLUCOSE BLD-MCNC: 245 MG/DL (ref 65–105)
HCT VFR BLD CALC: 29.9 % (ref 36.3–47.1)
HCT VFR BLD CALC: 33.9 % (ref 36.3–47.1)
HEMOGLOBIN: 8.4 G/DL (ref 11.9–15.1)
HEMOGLOBIN: 9.8 G/DL (ref 11.9–15.1)
INR BLD: 1.3
LACTIC ACID, SEPSIS WHOLE BLOOD: ABNORMAL MMOL/L (ref 0.5–1.9)
LACTIC ACID, SEPSIS: 4.7 MMOL/L (ref 0.5–1.9)
LACTIC ACID: 3.3 MMOL/L (ref 0.5–2.2)
MAGNESIUM: 1.6 MG/DL (ref 1.6–2.6)
MCH RBC QN AUTO: 21.9 PG (ref 25.2–33.5)
MCHC RBC AUTO-ENTMCNC: 28.1 G/DL (ref 25.2–33.5)
MCV RBC AUTO: 78.1 FL (ref 82.6–102.9)
NRBC AUTOMATED: 0 PER 100 WBC
PDW BLD-RTO: 19.1 % (ref 11.8–14.4)
PHOSPHORUS: 2.8 MG/DL (ref 2.6–4.5)
PLATELET # BLD: 82 K/UL (ref 138–453)
PMV BLD AUTO: 10.5 FL (ref 8.1–13.5)
POTASSIUM SERPL-SCNC: 2.8 MMOL/L (ref 3.7–5.3)
POTASSIUM SERPL-SCNC: 2.8 MMOL/L (ref 3.7–5.3)
POTASSIUM SERPL-SCNC: 3.2 MMOL/L (ref 3.7–5.3)
PROTHROMBIN TIME: 13.4 SEC (ref 9.4–11.3)
RBC # BLD: 3.83 M/UL (ref 3.95–5.11)
SODIUM BLD-SCNC: 143 MMOL/L (ref 135–144)
TOTAL PROTEIN: 4.7 G/DL (ref 6.4–8.3)
TSH SERPL DL<=0.05 MIU/L-ACNC: 0.41 MIU/L (ref 0.3–5)
WBC # BLD: 1.4 K/UL (ref 3.5–11.3)

## 2020-01-07 PROCEDURE — 82947 ASSAY GLUCOSE BLOOD QUANT: CPT

## 2020-01-07 PROCEDURE — 85018 HEMOGLOBIN: CPT

## 2020-01-07 PROCEDURE — 83735 ASSAY OF MAGNESIUM: CPT

## 2020-01-07 PROCEDURE — 6360000002 HC RX W HCPCS: Performed by: NURSE PRACTITIONER

## 2020-01-07 PROCEDURE — 2060000000 HC ICU INTERMEDIATE R&B

## 2020-01-07 PROCEDURE — 2500000003 HC RX 250 WO HCPCS: Performed by: NURSE PRACTITIONER

## 2020-01-07 PROCEDURE — 2580000003 HC RX 258: Performed by: NURSE PRACTITIONER

## 2020-01-07 PROCEDURE — 84132 ASSAY OF SERUM POTASSIUM: CPT

## 2020-01-07 PROCEDURE — 6370000000 HC RX 637 (ALT 250 FOR IP): Performed by: NURSE PRACTITIONER

## 2020-01-07 PROCEDURE — 85610 PROTHROMBIN TIME: CPT

## 2020-01-07 PROCEDURE — 84443 ASSAY THYROID STIM HORMONE: CPT

## 2020-01-07 PROCEDURE — 84100 ASSAY OF PHOSPHORUS: CPT

## 2020-01-07 PROCEDURE — 36415 COLL VENOUS BLD VENIPUNCTURE: CPT

## 2020-01-07 PROCEDURE — 85027 COMPLETE CBC AUTOMATED: CPT

## 2020-01-07 PROCEDURE — 85014 HEMATOCRIT: CPT

## 2020-01-07 PROCEDURE — APPSS45 APP SPLIT SHARED TIME 31-45 MINUTES: Performed by: NURSE PRACTITIONER

## 2020-01-07 PROCEDURE — 6370000000 HC RX 637 (ALT 250 FOR IP): Performed by: INTERNAL MEDICINE

## 2020-01-07 PROCEDURE — 83605 ASSAY OF LACTIC ACID: CPT

## 2020-01-07 PROCEDURE — 99223 1ST HOSP IP/OBS HIGH 75: CPT | Performed by: INTERNAL MEDICINE

## 2020-01-07 PROCEDURE — 82330 ASSAY OF CALCIUM: CPT

## 2020-01-07 PROCEDURE — 80053 COMPREHEN METABOLIC PANEL: CPT

## 2020-01-07 RX ORDER — POTASSIUM CHLORIDE 20 MEQ/1
20 TABLET, EXTENDED RELEASE ORAL DAILY
Status: DISCONTINUED | OUTPATIENT
Start: 2020-01-07 | End: 2020-01-07

## 2020-01-07 RX ORDER — ONDANSETRON 2 MG/ML
4 INJECTION INTRAMUSCULAR; INTRAVENOUS EVERY 4 HOURS PRN
Status: DISCONTINUED | OUTPATIENT
Start: 2020-01-07 | End: 2020-01-09 | Stop reason: HOSPADM

## 2020-01-07 RX ORDER — POTASSIUM CHLORIDE 20 MEQ/1
40 TABLET, EXTENDED RELEASE ORAL 2 TIMES DAILY
Status: DISCONTINUED | OUTPATIENT
Start: 2020-01-07 | End: 2020-01-09 | Stop reason: HOSPADM

## 2020-01-07 RX ORDER — POTASSIUM CHLORIDE 20 MEQ/1
40 TABLET, EXTENDED RELEASE ORAL PRN
Status: DISCONTINUED | OUTPATIENT
Start: 2020-01-07 | End: 2020-01-09 | Stop reason: HOSPADM

## 2020-01-07 RX ORDER — LOSARTAN POTASSIUM 25 MG/1
100 TABLET ORAL DAILY
Status: DISCONTINUED | OUTPATIENT
Start: 2020-01-07 | End: 2020-01-09 | Stop reason: HOSPADM

## 2020-01-07 RX ORDER — PANTOPRAZOLE SODIUM 40 MG/1
40 TABLET, DELAYED RELEASE ORAL
Status: DISCONTINUED | OUTPATIENT
Start: 2020-01-07 | End: 2020-01-09 | Stop reason: HOSPADM

## 2020-01-07 RX ORDER — POTASSIUM CHLORIDE 7.45 MG/ML
10 INJECTION INTRAVENOUS PRN
Status: DISCONTINUED | OUTPATIENT
Start: 2020-01-07 | End: 2020-01-09 | Stop reason: HOSPADM

## 2020-01-07 RX ORDER — TRAMADOL HYDROCHLORIDE 50 MG/1
100 TABLET ORAL EVERY 6 HOURS PRN
Status: DISCONTINUED | OUTPATIENT
Start: 2020-01-07 | End: 2020-01-07

## 2020-01-07 RX ORDER — NICOTINE 21 MG/24HR
1 PATCH, TRANSDERMAL 24 HOURS TRANSDERMAL DAILY
Status: DISCONTINUED | OUTPATIENT
Start: 2020-01-07 | End: 2020-01-09 | Stop reason: HOSPADM

## 2020-01-07 RX ORDER — CYCLOBENZAPRINE HCL 10 MG
10 TABLET ORAL 3 TIMES DAILY PRN
Status: DISCONTINUED | OUTPATIENT
Start: 2020-01-07 | End: 2020-01-09 | Stop reason: HOSPADM

## 2020-01-07 RX ORDER — CALCIUM POLYCARBOPHIL 625 MG 625 MG/1
625 TABLET ORAL DAILY
Status: DISCONTINUED | OUTPATIENT
Start: 2020-01-07 | End: 2020-01-09 | Stop reason: HOSPADM

## 2020-01-07 RX ORDER — 0.9 % SODIUM CHLORIDE 0.9 %
1000 INTRAVENOUS SOLUTION INTRAVENOUS ONCE
Status: COMPLETED | OUTPATIENT
Start: 2020-01-07 | End: 2020-01-07

## 2020-01-07 RX ORDER — FERROUS SULFATE 325(65) MG
325 TABLET ORAL DAILY
Status: DISCONTINUED | OUTPATIENT
Start: 2020-01-07 | End: 2020-01-09 | Stop reason: HOSPADM

## 2020-01-07 RX ORDER — TRAMADOL HYDROCHLORIDE 50 MG/1
50 TABLET ORAL EVERY 6 HOURS PRN
Status: DISCONTINUED | OUTPATIENT
Start: 2020-01-07 | End: 2020-01-07

## 2020-01-07 RX ADMIN — INSULIN LISPRO 2 UNITS: 100 INJECTION, SOLUTION INTRAVENOUS; SUBCUTANEOUS at 08:06

## 2020-01-07 RX ADMIN — INSULIN LISPRO 1 UNITS: 100 INJECTION, SOLUTION INTRAVENOUS; SUBCUTANEOUS at 21:16

## 2020-01-07 RX ADMIN — METOPROLOL TARTRATE 25 MG: 25 TABLET ORAL at 08:06

## 2020-01-07 RX ADMIN — LOSARTAN POTASSIUM 100 MG: 25 TABLET ORAL at 08:07

## 2020-01-07 RX ADMIN — Medication 10 ML: at 21:08

## 2020-01-07 RX ADMIN — AMLODIPINE BESYLATE 5 MG: 5 TABLET ORAL at 21:07

## 2020-01-07 RX ADMIN — INSULIN LISPRO 2 UNITS: 100 INJECTION, SOLUTION INTRAVENOUS; SUBCUTANEOUS at 13:32

## 2020-01-07 RX ADMIN — SODIUM CHLORIDE: 9 INJECTION, SOLUTION INTRAVENOUS at 11:15

## 2020-01-07 RX ADMIN — Medication 10 ML: at 08:07

## 2020-01-07 RX ADMIN — Medication 10 ML: at 23:01

## 2020-01-07 RX ADMIN — POTASSIUM CHLORIDE 40 MEQ: 20 TABLET, EXTENDED RELEASE ORAL at 21:05

## 2020-01-07 RX ADMIN — CALCIUM POLYCARBOPHIL 625 MG: 625 TABLET ORAL at 08:07

## 2020-01-07 RX ADMIN — POTASSIUM CHLORIDE 10 MEQ: 7.46 INJECTION, SOLUTION INTRAVENOUS at 22:53

## 2020-01-07 RX ADMIN — CALCIUM CHLORIDE 1 G: 100 INJECTION, SOLUTION INTRAVENOUS; INTRAVENTRICULAR at 11:14

## 2020-01-07 RX ADMIN — SODIUM CHLORIDE: 9 INJECTION, SOLUTION INTRAVENOUS at 22:03

## 2020-01-07 RX ADMIN — INSULIN LISPRO 2 UNITS: 100 INJECTION, SOLUTION INTRAVENOUS; SUBCUTANEOUS at 17:18

## 2020-01-07 RX ADMIN — POTASSIUM CHLORIDE 10 MEQ: 7.46 INJECTION, SOLUTION INTRAVENOUS at 21:06

## 2020-01-07 RX ADMIN — FERROUS SULFATE TAB 325 MG (65 MG ELEMENTAL FE) 325 MG: 325 (65 FE) TAB at 08:06

## 2020-01-07 RX ADMIN — INSULIN GLARGINE 20 UNITS: 100 INJECTION, SOLUTION SUBCUTANEOUS at 21:17

## 2020-01-07 RX ADMIN — METOPROLOL TARTRATE 5 MG: 1 INJECTION, SOLUTION INTRAVENOUS at 13:38

## 2020-01-07 RX ADMIN — SODIUM CHLORIDE 1000 ML: 9 INJECTION, SOLUTION INTRAVENOUS at 00:38

## 2020-01-07 RX ADMIN — METOPROLOL TARTRATE 25 MG: 25 TABLET ORAL at 21:06

## 2020-01-07 RX ADMIN — POTASSIUM BICARBONATE 40 MEQ: 782 TABLET, EFFERVESCENT ORAL at 14:55

## 2020-01-07 RX ADMIN — SODIUM CHLORIDE: 9 INJECTION, SOLUTION INTRAVENOUS at 04:14

## 2020-01-07 RX ADMIN — CEFTRIAXONE 1 G: 1 INJECTION, POWDER, FOR SOLUTION INTRAMUSCULAR; INTRAVENOUS at 14:55

## 2020-01-07 RX ADMIN — METOPROLOL TARTRATE 5 MG: 1 INJECTION, SOLUTION INTRAVENOUS at 22:58

## 2020-01-07 RX ADMIN — POTASSIUM CHLORIDE 40 MEQ: 20 TABLET, EXTENDED RELEASE ORAL at 08:06

## 2020-01-07 RX ADMIN — PANTOPRAZOLE SODIUM 40 MG: 40 TABLET, DELAYED RELEASE ORAL at 22:01

## 2020-01-07 ASSESSMENT — PAIN SCALES - WONG BAKER
WONGBAKER_NUMERICALRESPONSE: 0
WONGBAKER_NUMERICALRESPONSE: 0

## 2020-01-07 ASSESSMENT — PAIN SCALES - GENERAL
PAINLEVEL_OUTOF10: 0

## 2020-01-07 NOTE — H&P
HOSPITALIST ADMISSION H&P      REASON FOR ADMISSION: UTI, hyperglycemia, lightheadedness   ESTIMATED LENGTH OF STAY:>2 midnights, 3-4 days    ATTENDING/ADMITTING PHYSICIAN: Pineda Mosqueda MD  PCP: Edel Villela    HISTORY OF PRESENT ILLNESS:      The patient is a 30 y.o. female patient of Edel Villela who presents from the ER with c/o feeling lightheaded and shaky for the past 1 day. She reports associated nausea, but denies emesis, diarrhea, fevers, vertigo, chest pain, palpitations, vision changes, headaches, or shortness of breath. She tried her PO ativan without relief, so came to the ER. She is undergoing chemotherapy for cervical cancer (diagnosed 12/11/2019, small cell, stage IIB, PET scan positive for posterior bladder wall involvement and local reactive lymph nodes, rare histology) with her last chemotherapy (cisplatan and etoposide) on 01/02/2020 (cycle 1, day 3). Her gyn/onc is Dr. Fiona Martinez. She states she has had similar lightheadedness before, but that it would resolve within a few hours so she never sought medical evaluation. She has hypertension, chronic anemia, and hypokalemia. In ER, she was mildly tachycardic with HR in the 110's (sinus tachycardia). Afebrile. WBC 4.2. UA showed UTI with hematuria. Her glucose was elevated at 346. She denies previous diagnosis of diabetes. HgbA1C 7.4. Hypokalemia -- 3.3 --> 3.2    She is an active smoker    Present prior to admission: Right subclavian port     See below for additional PMH. Patient ugok-vkugknhrtt-ajwboygs-available records reviewed, including, but not limited to ER records, imaging results, lab results, office records, personal records, and OARRS -- no signs of abuse or diversion.      Past Medical History:   Diagnosis Date    Chlamydia     Endometriosis     Hypertension     Hypokalemia            Past Surgical History:   Procedure Laterality Date    CHOLECYSTECTOMY      GALLBLADDER SURGERY         Medications Prior to Admission: disease, hematologic, neurologic, psychiatric, skin, lymphatics, or malignancies. Code status discussed with patient/family--wishes for Full Code at this time.     PHYSICAL EXAM:  Vitals:  BP (!) 141/84   Pulse 72   Temp 98.5 °F (36.9 °C) (Tympanic)   Resp 16   Ht 5' 6\" (1.676 m)   Wt (!) 306 lb 1.6 oz (138.8 kg)   SpO2 97%   BMI 49.41 kg/m²     General: awake, alert and cooperative  HEENT: External nose normal, Normocephalic and Atraumatic  Neck: Supple, No Masses, Tenderness, Nodularity and No Lymphadenopathy  Chest/Lungs: Clear to Auscultation without Rales, Rhonchi, or Wheezes, Distant Breath Sounds and Respirations even and unlabored  Cardiac: regular rhythm, HR  and Pedal Pulses Palpable Bilaterally  GI/Abdomen: obese, Bowel Sounds Present and Soft, Non-tender, without Guarding or Rebound Tenderness  : Not examined  Extremities/Musculoskeletal: BLE with edema (chronic 1+ R>L)  Skin: No Cyanosis, No rash and Skin warm and dry  Neuro: right foot neuropathy present, Alert and Oriented, to Person, to Time, to Place, to Situation and No Localizing Signs/Symptoms  Psychiatric: Normal mood and affect and anxious      LABS:    CBC with Differential:    Lab Results   Component Value Date    WBC 1.4 01/07/2020    RBC 3.83 01/07/2020    HGB 8.4 01/07/2020    HCT 29.9 01/07/2020    PLT 82 01/07/2020    MCV 78.1 01/07/2020    MCH 21.9 01/07/2020    MCHC 28.1 01/07/2020    RDW 19.1 01/07/2020    LYMPHOPCT 9 01/06/2020    MONOPCT 1 01/06/2020    BASOPCT 1 01/06/2020    MONOSABS 0.04 01/06/2020    LYMPHSABS 0.38 01/06/2020    EOSABS 0.00 01/06/2020    BASOSABS 0.04 01/06/2020    DIFFTYPE NOT REPORTED 01/06/2020     CMP:    Lab Results   Component Value Date     01/07/2020    K 3.2 01/07/2020     01/07/2020    CO2 30 01/07/2020    BUN 15 01/07/2020    CREATININE 0.49 01/07/2020    GFRAA >60 01/07/2020    LABGLOM >60 01/07/2020    GLUCOSE 222 01/07/2020    PROT 4.7 01/07/2020    LABALBU 3.1

## 2020-01-08 LAB
ABSOLUTE EOS #: 0 K/UL (ref 0–0.4)
ABSOLUTE IMMATURE GRANULOCYTE: 0 K/UL (ref 0–0.3)
ABSOLUTE LYMPH #: 0.36 K/UL (ref 1–4.8)
ABSOLUTE MONO #: 0 K/UL (ref 0.1–1.2)
ANION GAP SERPL CALCULATED.3IONS-SCNC: 12 MMOL/L (ref 9–17)
BASOPHILS # BLD: 0 % (ref 0–1)
BASOPHILS ABSOLUTE: 0 K/UL (ref 0–0.2)
BUN BLDV-MCNC: 7 MG/DL (ref 6–20)
BUN/CREAT BLD: 17 (ref 9–20)
C DIFF AG + TOXIN: ABNORMAL
CALCIUM SERPL-MCNC: 8 MG/DL (ref 8.6–10.4)
CELLS COUNTED: 25
CHLORIDE BLD-SCNC: 101 MMOL/L (ref 98–107)
CO2: 30 MMOL/L (ref 20–31)
CREAT SERPL-MCNC: 0.41 MG/DL (ref 0.5–0.9)
CULTURE: ABNORMAL
DIFFERENTIAL TYPE: ABNORMAL
EOSINOPHILS RELATIVE PERCENT: 0 % (ref 1–7)
GFR AFRICAN AMERICAN: >60 ML/MIN
GFR NON-AFRICAN AMERICAN: >60 ML/MIN
GFR SERPL CREATININE-BSD FRML MDRD: ABNORMAL ML/MIN/{1.73_M2}
GFR SERPL CREATININE-BSD FRML MDRD: ABNORMAL ML/MIN/{1.73_M2}
GLUCOSE BLD-MCNC: 173 MG/DL (ref 65–105)
GLUCOSE BLD-MCNC: 211 MG/DL (ref 70–99)
GLUCOSE BLD-MCNC: 213 MG/DL (ref 65–105)
GLUCOSE BLD-MCNC: 228 MG/DL (ref 65–105)
HCT VFR BLD CALC: 29.7 % (ref 36.3–47.1)
HEMOGLOBIN: 8.5 G/DL (ref 11.9–15.1)
IMMATURE GRANULOCYTES: 0 %
LYMPHOCYTES # BLD: 36 % (ref 16–46)
Lab: ABNORMAL
MAGNESIUM: 1.3 MG/DL (ref 1.6–2.6)
MAGNESIUM: 1.7 MG/DL (ref 1.6–2.6)
MAGNESIUM: 1.8 MG/DL (ref 1.6–2.6)
MCH RBC QN AUTO: 22 PG (ref 25.2–33.5)
MCHC RBC AUTO-ENTMCNC: 28.6 G/DL (ref 25.2–33.5)
MCV RBC AUTO: 76.9 FL (ref 82.6–102.9)
MONOCYTES # BLD: 0 % (ref 4–11)
MORPHOLOGY: ABNORMAL
MORPHOLOGY: ABNORMAL
NRBC AUTOMATED: 0 PER 100 WBC
PDW BLD-RTO: 18.6 % (ref 11.8–14.4)
PLATELET # BLD: 66 K/UL (ref 138–453)
PLATELET ESTIMATE: ABNORMAL
PMV BLD AUTO: 10.9 FL (ref 8.1–13.5)
POTASSIUM SERPL-SCNC: 2.5 MMOL/L (ref 3.7–5.3)
POTASSIUM SERPL-SCNC: 2.7 MMOL/L (ref 3.7–5.3)
POTASSIUM SERPL-SCNC: 2.8 MMOL/L (ref 3.7–5.3)
RBC # BLD: 3.86 M/UL (ref 3.95–5.11)
RBC # BLD: ABNORMAL 10*6/UL
SEG NEUTROPHILS: 64 % (ref 43–77)
SEGMENTED NEUTROPHILS ABSOLUTE COUNT: 0.64 K/UL (ref 1.8–7.7)
SODIUM BLD-SCNC: 143 MMOL/L (ref 135–144)
SPECIMEN DESCRIPTION: ABNORMAL
SPECIMEN DESCRIPTION: ABNORMAL
WBC # BLD: 1 K/UL (ref 3.5–11.3)
WBC # BLD: ABNORMAL 10*3/UL

## 2020-01-08 PROCEDURE — 6370000000 HC RX 637 (ALT 250 FOR IP): Performed by: NURSE PRACTITIONER

## 2020-01-08 PROCEDURE — 84132 ASSAY OF SERUM POTASSIUM: CPT

## 2020-01-08 PROCEDURE — 85025 COMPLETE CBC W/AUTO DIFF WBC: CPT

## 2020-01-08 PROCEDURE — 2060000000 HC ICU INTERMEDIATE R&B

## 2020-01-08 PROCEDURE — 36415 COLL VENOUS BLD VENIPUNCTURE: CPT

## 2020-01-08 PROCEDURE — 6360000002 HC RX W HCPCS: Performed by: NURSE PRACTITIONER

## 2020-01-08 PROCEDURE — 87324 CLOSTRIDIUM AG IA: CPT

## 2020-01-08 PROCEDURE — 99251 PR INITL INPATIENT CONSULT NEW/ESTAB PT 20 MIN: CPT | Performed by: NURSE PRACTITIONER

## 2020-01-08 PROCEDURE — 82947 ASSAY GLUCOSE BLOOD QUANT: CPT

## 2020-01-08 PROCEDURE — 2580000003 HC RX 258: Performed by: NURSE PRACTITIONER

## 2020-01-08 PROCEDURE — 6370000000 HC RX 637 (ALT 250 FOR IP): Performed by: INTERNAL MEDICINE

## 2020-01-08 PROCEDURE — 87493 C DIFF AMPLIFIED PROBE: CPT

## 2020-01-08 PROCEDURE — 87449 NOS EACH ORGANISM AG IA: CPT

## 2020-01-08 PROCEDURE — 6360000002 HC RX W HCPCS: Performed by: INTERNAL MEDICINE

## 2020-01-08 PROCEDURE — 99232 SBSQ HOSP IP/OBS MODERATE 35: CPT | Performed by: INTERNAL MEDICINE

## 2020-01-08 PROCEDURE — 80048 BASIC METABOLIC PNL TOTAL CA: CPT

## 2020-01-08 PROCEDURE — 83735 ASSAY OF MAGNESIUM: CPT

## 2020-01-08 RX ORDER — POTASSIUM CHLORIDE 20 MEQ/1
60 TABLET, EXTENDED RELEASE ORAL ONCE
Status: COMPLETED | OUTPATIENT
Start: 2020-01-08 | End: 2020-01-08

## 2020-01-08 RX ORDER — MAGNESIUM SULFATE 1 G/100ML
1 INJECTION INTRAVENOUS ONCE
Status: COMPLETED | OUTPATIENT
Start: 2020-01-08 | End: 2020-01-08

## 2020-01-08 RX ORDER — POTASSIUM CHLORIDE 20 MEQ/1
40 TABLET, EXTENDED RELEASE ORAL ONCE
Status: COMPLETED | OUTPATIENT
Start: 2020-01-08 | End: 2020-01-08

## 2020-01-08 RX ORDER — MAGNESIUM SULFATE 1 G/100ML
1 INJECTION INTRAVENOUS
Status: COMPLETED | OUTPATIENT
Start: 2020-01-08 | End: 2020-01-08

## 2020-01-08 RX ORDER — MAGNESIUM SULFATE IN WATER 40 MG/ML
2 INJECTION, SOLUTION INTRAVENOUS ONCE
Status: DISCONTINUED | OUTPATIENT
Start: 2020-01-08 | End: 2020-01-08 | Stop reason: RX

## 2020-01-08 RX ORDER — FUROSEMIDE 10 MG/ML
40 INJECTION INTRAMUSCULAR; INTRAVENOUS ONCE
Status: COMPLETED | OUTPATIENT
Start: 2020-01-08 | End: 2020-01-08

## 2020-01-08 RX ORDER — INSULIN GLARGINE 100 [IU]/ML
40 INJECTION, SOLUTION SUBCUTANEOUS NIGHTLY
Status: DISCONTINUED | OUTPATIENT
Start: 2020-01-08 | End: 2020-01-09 | Stop reason: HOSPADM

## 2020-01-08 RX ORDER — IBUPROFEN 600 MG/1
600 TABLET ORAL 2 TIMES DAILY PRN
Status: DISCONTINUED | OUTPATIENT
Start: 2020-01-08 | End: 2020-01-09 | Stop reason: HOSPADM

## 2020-01-08 RX ADMIN — TBO-FILGRASTIM 480 MCG: 480 INJECTION, SOLUTION SUBCUTANEOUS at 10:30

## 2020-01-08 RX ADMIN — INSULIN LISPRO 2 UNITS: 100 INJECTION, SOLUTION INTRAVENOUS; SUBCUTANEOUS at 12:32

## 2020-01-08 RX ADMIN — INSULIN LISPRO 1 UNITS: 100 INJECTION, SOLUTION INTRAVENOUS; SUBCUTANEOUS at 17:09

## 2020-01-08 RX ADMIN — INSULIN LISPRO 13 UNITS: 100 INJECTION, SOLUTION INTRAVENOUS; SUBCUTANEOUS at 12:32

## 2020-01-08 RX ADMIN — POTASSIUM CHLORIDE 60 MEQ: 20 TABLET, EXTENDED RELEASE ORAL at 22:18

## 2020-01-08 RX ADMIN — MAGNESIUM SULFATE HEPTAHYDRATE 1 G: 1 INJECTION, SOLUTION INTRAVENOUS at 10:31

## 2020-01-08 RX ADMIN — FERROUS SULFATE TAB 325 MG (65 MG ELEMENTAL FE) 325 MG: 325 (65 FE) TAB at 09:03

## 2020-01-08 RX ADMIN — FUROSEMIDE 40 MG: 10 INJECTION, SOLUTION INTRAMUSCULAR; INTRAVENOUS at 14:35

## 2020-01-08 RX ADMIN — POTASSIUM CHLORIDE 40 MEQ: 20 TABLET, EXTENDED RELEASE ORAL at 18:51

## 2020-01-08 RX ADMIN — ONDANSETRON 4 MG: 2 INJECTION INTRAMUSCULAR; INTRAVENOUS at 14:41

## 2020-01-08 RX ADMIN — MAGNESIUM SULFATE 1 G: 1 INJECTION INTRAVENOUS at 17:29

## 2020-01-08 RX ADMIN — POTASSIUM CHLORIDE 40 MEQ: 20 TABLET, EXTENDED RELEASE ORAL at 09:06

## 2020-01-08 RX ADMIN — POTASSIUM CHLORIDE 40 MEQ: 20 TABLET, EXTENDED RELEASE ORAL at 17:09

## 2020-01-08 RX ADMIN — INSULIN GLARGINE 40 UNITS: 100 INJECTION, SOLUTION SUBCUTANEOUS at 20:16

## 2020-01-08 RX ADMIN — MAGNESIUM SULFATE HEPTAHYDRATE 1 G: 1 INJECTION, SOLUTION INTRAVENOUS at 09:06

## 2020-01-08 RX ADMIN — POTASSIUM CHLORIDE 40 MEQ: 20 TABLET, EXTENDED RELEASE ORAL at 20:13

## 2020-01-08 RX ADMIN — POTASSIUM CHLORIDE 10 MEQ: 7.46 INJECTION, SOLUTION INTRAVENOUS at 05:30

## 2020-01-08 RX ADMIN — AMLODIPINE BESYLATE 5 MG: 5 TABLET ORAL at 20:15

## 2020-01-08 RX ADMIN — INSULIN LISPRO 13 UNITS: 100 INJECTION, SOLUTION INTRAVENOUS; SUBCUTANEOUS at 17:10

## 2020-01-08 RX ADMIN — POTASSIUM CHLORIDE 40 MEQ: 20 TABLET, EXTENDED RELEASE ORAL at 09:03

## 2020-01-08 RX ADMIN — INSULIN LISPRO 1 UNITS: 100 INJECTION, SOLUTION INTRAVENOUS; SUBCUTANEOUS at 20:15

## 2020-01-08 RX ADMIN — METOPROLOL TARTRATE 25 MG: 25 TABLET ORAL at 20:15

## 2020-01-08 RX ADMIN — CEFTRIAXONE 1 G: 1 INJECTION, POWDER, FOR SOLUTION INTRAMUSCULAR; INTRAVENOUS at 14:35

## 2020-01-08 RX ADMIN — SODIUM CHLORIDE: 9 INJECTION, SOLUTION INTRAVENOUS at 12:33

## 2020-01-08 RX ADMIN — Medication 10 ML: at 20:18

## 2020-01-08 RX ADMIN — IBUPROFEN 600 MG: 600 TABLET, FILM COATED ORAL at 10:29

## 2020-01-08 RX ADMIN — SODIUM CHLORIDE: 9 INJECTION, SOLUTION INTRAVENOUS at 04:36

## 2020-01-08 RX ADMIN — POTASSIUM CHLORIDE 10 MEQ: 7.46 INJECTION, SOLUTION INTRAVENOUS at 23:31

## 2020-01-08 RX ADMIN — PANTOPRAZOLE SODIUM 40 MG: 40 TABLET, DELAYED RELEASE ORAL at 06:13

## 2020-01-08 RX ADMIN — POTASSIUM CHLORIDE 10 MEQ: 7.46 INJECTION, SOLUTION INTRAVENOUS at 22:19

## 2020-01-08 RX ADMIN — POTASSIUM CHLORIDE 10 MEQ: 7.46 INJECTION, SOLUTION INTRAVENOUS at 03:56

## 2020-01-08 RX ADMIN — INSULIN LISPRO 2 UNITS: 100 INJECTION, SOLUTION INTRAVENOUS; SUBCUTANEOUS at 09:05

## 2020-01-08 RX ADMIN — LOSARTAN POTASSIUM 100 MG: 25 TABLET ORAL at 09:03

## 2020-01-08 RX ADMIN — POTASSIUM CHLORIDE 10 MEQ: 7.46 INJECTION, SOLUTION INTRAVENOUS at 01:37

## 2020-01-08 RX ADMIN — CALCIUM POLYCARBOPHIL 625 MG: 625 TABLET ORAL at 09:03

## 2020-01-08 RX ADMIN — METOPROLOL TARTRATE 25 MG: 25 TABLET ORAL at 09:03

## 2020-01-08 ASSESSMENT — PAIN SCALES - WONG BAKER
WONGBAKER_NUMERICALRESPONSE: 0

## 2020-01-08 ASSESSMENT — ENCOUNTER SYMPTOMS
SHORTNESS OF BREATH: 0
DIARRHEA: 0
RESPIRATORY NEGATIVE: 1
ABDOMINAL PAIN: 0

## 2020-01-08 ASSESSMENT — PAIN SCALES - GENERAL
PAINLEVEL_OUTOF10: 0
PAINLEVEL_OUTOF10: 3

## 2020-01-08 NOTE — FLOWSHEET NOTE
Critical Potassium of 2.8 reports by Melchor Ruiz from Lab. Note taken. Verbally reported and to Nelly Monsalve RN and Note given. Nelly Monsalve RN to contact offsite provider for ordered as the patient does not have a K+ sliding scale.

## 2020-01-08 NOTE — PROGRESS NOTES
01/08/2020    MCH 22.0 01/08/2020    MCHC 28.6 01/08/2020    RDW 18.6 01/08/2020    LYMPHOPCT 36 01/08/2020    MONOPCT 0 01/08/2020    BASOPCT 0 01/08/2020    MONOSABS 0.00 01/08/2020    LYMPHSABS 0.36 01/08/2020    EOSABS 0.00 01/08/2020    BASOSABS 0.00 01/08/2020    DIFFTYPE NOT REPORTED 01/08/2020     BMP:    Lab Results   Component Value Date     01/08/2020    K 2.8 01/08/2020     01/08/2020    CO2 30 01/08/2020    BUN 7 01/08/2020    LABALBU 3.1 01/07/2020    CREATININE 0.41 01/08/2020    CALCIUM 8.0 01/08/2020    GFRAA >60 01/08/2020    LABGLOM >60 01/08/2020    GLUCOSE 211 01/08/2020     Magnesium:    Lab Results   Component Value Date    MG 1.7 01/08/2020           Physical Exam:  Vitals: BP (!) 144/85   Pulse 93   Temp 96.3 °F (35.7 °C) (Tympanic)   Resp 16   Ht 5' 6\" (1.676 m)   Wt (!) 308 lb 11.2 oz (140 kg)   SpO2 99%   BMI 49.83 kg/m²   24 hour intake/output:    Intake/Output Summary (Last 24 hours) at 1/8/2020 1738  Last data filed at 1/8/2020 1545  Gross per 24 hour   Intake 4633 ml   Output --   Net 4633 ml     Last 3 weights: Wt Readings from Last 3 Encounters:   01/08/20 (!) 308 lb 11.2 oz (140 kg)   12/16/19 (!) 315 lb (142.9 kg)   12/09/19 (!) 315 lb (142.9 kg)     HEENT: Normocephalic and Atraumatic  Neck: Supple, No Masses, Tenderness, Nodularity and No Lymphadenopathy  Chest/Lungs: Clear to Auscultation without Rales, Rhonchi, or Wheezes  Cardiac: Regular Rate and Rhythm  GI/Abdomen: Bowel Sounds Present and Soft, Non-tender, without Guarding or Rebound Tenderness  : Not examined  EXT/Skin: No Edema, No Cyanosis and No Clubbing  Neuro: flat affect-----, Alert and Oriented and No Localizing Signs/Symptoms      Assessment:    Active Problems:    UTI (urinary tract infection)    Lightheadedness    Type 2 diabetes mellitus with hyperglycemia, without long-term current use of insulin (HCC)  Resolved Problems:    * No resolved hospital problems.  *    Chau Lemus sage               [place]  world call  29  WF  [facility]--[place]]  FULL CODE         Anti-infectives:  Rocephin IV     UTI---POA---1.6.2020----hematuria  Sepsis ruled out---1.6.2020  Diabetes Mellitus Type 2----newly diagnosed  Cervical carcinoma---metastatic---no surgery---currently receiving                     chemotherapy with anticipated subsequent XRT  Anti-neoplastic induced pancytopenia----1.6.2020----Granix 480---1.8.2020  Dehydration   Hypertension   Sinus tachycardia         EKG---1.6.2020---sinus tachycardia---125--ADDIE--LVH---NSSTTCs  CKD---at least Stage 2  Morbid obesity   Anxiety   Tobacco abuse----1 PPD---quit--12.31.2019  Hypokalemia   Hypomagnesemia   PMH:    endometriosis   PSH:    cholecystectomy    Allergies:  Gentamycin          Plan:  1. Hypokalemia---potassium replacement  2. Hypomagnesemia---magnesium replacement  3. UTI---Rocephin IV pending culture  4. DM2---see above   5. Anemia---Granix 480 x 1  6.    See orders    Electronically signed by Henny Bailey on 1/8/2020 at 5:38 PM    Hospitalist

## 2020-01-08 NOTE — PLAN OF CARE
Problem: Pain:  Goal: Pain level will decrease  Description  Pain level will decrease  1/8/2020 1142 by Zbigniew Granados RN  Outcome: Ongoing  1/8/2020 0053 by Brittnee Villegas RN  Outcome: Ongoing  Goal: Control of acute pain  Description  Control of acute pain  1/8/2020 1142 by Zbigniew Granados RN  Outcome: Ongoing  1/8/2020 0053 by Brittnee Villegas RN  Outcome: Ongoing  Goal: Control of chronic pain  Description  Control of chronic pain  1/8/2020 1142 by Zbigniew Granados RN  Outcome: Ongoing  1/8/2020 0053 by Brittnee Villegas RN  Outcome: Ongoing     Problem: Sensory:  Goal: General experience of comfort will improve  Description  General experience of comfort will improve  1/8/2020 1142 by Zbigniew Granados RN  Outcome: Ongoing  1/8/2020 0053 by Brittnee Villegas RN  Outcome: Ongoing     Problem: Urinary Elimination:  Goal: Signs and symptoms of infection will decrease  Description  Signs and symptoms of infection will decrease  1/8/2020 1142 by Zbigniew Granados RN  Outcome: Ongoing  1/8/2020 0053 by Brittnee Villegas RN  Outcome: Ongoing  Goal: Ability to reestablish a normal urinary elimination pattern will improve - after catheter removal  Description  Ability to reestablish a normal urinary elimination pattern will improve  1/8/2020 1142 by Zbigniew Granados RN  Outcome: Ongoing  1/8/2020 0053 by Brittnee Villegas RN  Outcome: Ongoing  Goal: Complications related to the disease process, condition or treatment will be avoided or minimized  Description  Complications related to the disease process, condition or treatment will be avoided or minimized  1/8/2020 1142 by Zbigniew Granados RN  Outcome: Ongoing  1/8/2020 0053 by Brittnee Villegas RN  Outcome: Ongoing

## 2020-01-08 NOTE — PLAN OF CARE
Problem: Pain:  Goal: Pain level will decrease  Description  Pain level will decrease  Outcome: Ongoing  Goal: Control of acute pain  Description  Control of acute pain  Outcome: Ongoing  Goal: Control of chronic pain  Description  Control of chronic pain  Outcome: Ongoing     Problem: Pain:  Goal: Pain level will decrease  Description  Pain level will decrease  Outcome: Ongoing  Goal: Control of acute pain  Description  Control of acute pain  Outcome: Ongoing  Goal: Control of chronic pain  Description  Control of chronic pain  Outcome: Ongoing

## 2020-01-08 NOTE — CONSULTS
Units Subcutaneous TID     insulin lispro  0-3 Units Subcutaneous Nightly    cefTRIAXone (ROCEPHIN) IV  1 g Intravenous Q24H     Continuous Infusions:   sodium chloride 150 mL/hr at 01/08/20 1233    dextrose       PRN Meds:ibuprofen, ondansetron, cyclobenzaprine, potassium chloride **OR** potassium alternative oral replacement **OR** potassium chloride, sodium chloride flush, acetaminophen, glucose, dextrose, glucagon (rDNA), dextrose, metoprolol    Objective:  Labs:  HgBA1c:    Lab Results   Component Value Date    LABA1C 7.4 01/06/2020     Hemoglobin A1C   Date Value Ref Range Status   01/06/2020 7.4 (H) 4.8 - 5.9 % Final         Physical Exam:  Vitals: BP (!) 167/98   Pulse 82   Temp 97.8 °F (36.6 °C) (Tympanic)   Resp 18   Ht 5' 6\" (1.676 m)   Wt (!) 308 lb 11.2 oz (140 kg)   SpO2 99%   BMI 49.83 kg/m²   24 hour intake/output:    Intake/Output Summary (Last 24 hours) at 1/8/2020 1546  Last data filed at 1/8/2020 1233  Gross per 24 hour   Intake 4283 ml   Output --   Net 4283 ml     Last 3 weights: Wt Readings from Last 3 Encounters:   01/08/20 (!) 308 lb 11.2 oz (140 kg)   12/16/19 (!) 315 lb (142.9 kg)   12/09/19 (!) 315 lb (142.9 kg)       Physical Exam  Constitutional:       Appearance: She is well-developed. Eyes:      Pupils: Pupils are equal, round, and reactive to light. Cardiovascular:      Rate and Rhythm: Normal rate and regular rhythm. Heart sounds: Normal heart sounds. Pulmonary:      Effort: Pulmonary effort is normal.      Breath sounds: Normal breath sounds. Abdominal:      General: Bowel sounds are normal.      Palpations: Abdomen is soft. Skin:     General: Skin is warm and dry. Comments: Negative for open/nonhealing wounds. Negative for lipohypertrophy. Acanthosis nigricans noted to neck and arm folds. Neurological:      Mental Status: She is alert and oriented to person, place, and time.          Assessment:    Active Problems:    UTI (urinary tract Fingerstick    EKG 12 Lead    EKG REPORT    Insert peripheral IV    PATIENT STATUS (FROM ED OR OR/PROCEDURAL) Inpatient     Orders Placed This Encounter   Medications    0.9 % sodium chloride bolus    ondansetron (ZOFRAN) injection 4 mg    aluminum & magnesium hydroxide-simethicone (MAALOX) 30 mL, lidocaine viscous hcl (XYLOCAINE) 5 mL (GI COCKTAIL)    0.9 % sodium chloride bolus    cefTRIAXone (ROCEPHIN) 1 g IVPB in 50 mL D5W minibag    0.9 % sodium chloride bolus    amLODIPine (NORVASC) tablet 5 mg    metoprolol tartrate (LOPRESSOR) tablet 25 mg    0.9 % sodium chloride infusion    sodium chloride flush 0.9 % injection 10 mL    sodium chloride flush 0.9 % injection 10 mL    DISCONTD: enoxaparin (LOVENOX) injection 40 mg     May adjust as needed for renal function.     acetaminophen (TYLENOL) tablet 650 mg    glucose (GLUTOSE) 40 % oral gel 15 g    dextrose 50 % IV solution    glucagon (rDNA) injection 1 mg    dextrose 5 % solution    DISCONTD: insulin glargine (LANTUS) injection vial 20 Units    insulin lispro (HUMALOG) injection vial 0-6 Units    insulin lispro (HUMALOG) injection vial 0-3 Units    cefTRIAXone (ROCEPHIN) 1 g IVPB in 50 mL D5W minibag    0.9 % sodium chloride bolus    metoprolol (LOPRESSOR) injection 5 mg    0.9 % sodium chloride bolus    DISCONTD: traMADol (ULTRAM) tablet 50 mg    DISCONTD: traMADol (ULTRAM) tablet 100 mg    DISCONTD: potassium chloride (KLOR-CON M) extended release tablet 20 mEq    ferrous sulfate tablet 325 mg    losartan (COZAAR) tablet 100 mg    polycarbophil (FIBERCON) tablet 625 mg    potassium chloride (KLOR-CON M) extended release tablet 40 mEq    calcium chloride 1 g in sodium chloride 0.9 % 100 mL IVPB    nicotine (NICODERM CQ) 21 MG/24HR 1 patch    ondansetron (ZOFRAN) injection 4 mg    cyclobenzaprine (FLEXERIL) tablet 10 mg    DISCONTD: potassium bicarb-citric acid (EFFER-K) effervescent tablet 40 mEq    potassium bicarb-citric acid (EFFER-K) effervescent tablet 40 mEq    OR Linked Order Group     potassium chloride (KLOR-CON M) extended release tablet 40 mEq     potassium bicarb-citric acid (EFFER-K) effervescent tablet 40 mEq     potassium chloride 10 mEq/100 mL IVPB (Peripheral Line)    pantoprazole (PROTONIX) tablet 40 mg    potassium chloride (KLOR-CON M) extended release tablet 40 mEq     In addition to morning dose    DISCONTD: magnesium sulfate 2 g in 50 mL IVPB premix    magnesium sulfate 1 g in dextrose 5% 100 mL IVPB    ibuprofen (ADVIL;MOTRIN) tablet 600 mg    insulin lispro (HUMALOG) injection vial 13 Units    insulin glargine (LANTUS) injection vial 40 Units    DISCONTD: filgrastim (NEUPOGEN) injection 480 mcg    DISCONTD: Tbo-Filgrastim (GRANIX) injection 480 mcg    Tbo-Filgrastim (GRANIX) injection 480 mcg    furosemide (LASIX) injection 40 mg             Plan: Diabetes type 2  1. --Initial diabetic education completed. Discussed diabetes as a disease and how we can manage it to prevent complications associated with it. 2. Encourage patient to count carbohydrates and document using carb counting devin. Patient is agreeable to follow-up as outpatient for further diabetic management. Will educate patient on glucometer and medication options when she is discharged. 3. Initial inpatient diabetic education completed. We discussed diabetes as a disease, progression, and management. We discussed the importance of lifestyle and medication management to improve diabetes and reduce risk for complications associated with being uncontrolled. I encouraged patient to follow outpatient for diabetic education. 4. Follow up as outpatient for diabetic education post hospital discharge 1-2 weeks      Patient was seen with total face to face time of 30 minutes. More than 50%  of this visit was counseling and education regarding her diabetes.     Electronically signed by CHIQUITA Paniagua CNP on 1/8/2020 at 3:46 PM

## 2020-01-08 NOTE — PROGRESS NOTES
LESLI FELIX                   [facility]  world call  29  WF  [employer], --[place]]  FULL CODE         Anti-infectives:      UTI---POA---1.6.2020----hematuria  Sepsis ruled out---1.6.2020  Diabetes Mellitus Type 2----newly diagnosed  Cervical carcinoma---metastatic---no surgery---currently receiving                     chemotherapy with anticipated subsequent XRT  Anti-neoplastic induced pancytopenia----1.6.2020  Dehydration   Hypertension   Sinus tachycardia         EKG---1.6.2020---sinus tachycardia---125--ADDIE--LVH---NSSTTCs  CKD---at least Stage 2  Morbid obesity   Anxiety   Tobacco abuse----1 PPD---quit--12.31.2019  Hypokalemia   PMH:    endometriosis   PSH:    cholecystectomy    Allergies:  Gentamycin

## 2020-01-09 VITALS
HEIGHT: 66 IN | RESPIRATION RATE: 18 BRPM | WEIGHT: 293 LBS | HEART RATE: 90 BPM | BODY MASS INDEX: 47.09 KG/M2 | SYSTOLIC BLOOD PRESSURE: 151 MMHG | OXYGEN SATURATION: 100 % | TEMPERATURE: 97.6 F | DIASTOLIC BLOOD PRESSURE: 100 MMHG

## 2020-01-09 LAB
ABSOLUTE BANDS #: 0.12 K/UL
ABSOLUTE EOS #: 0 K/UL (ref 0–0.4)
ABSOLUTE IMMATURE GRANULOCYTE: 0 K/UL (ref 0–0.3)
ABSOLUTE LYMPH #: 0.32 K/UL (ref 1–4.8)
ABSOLUTE MONO #: 0.03 K/UL (ref 0.1–1.2)
ANION GAP SERPL CALCULATED.3IONS-SCNC: 11 MMOL/L (ref 9–17)
ATYPICAL LYMPHOCYTE ABSOLUTE COUNT: 0.04 K/UL
ATYPICAL LYMPHOCYTES: 4 %
BANDS: 11 %
BASOPHILS # BLD: 0 % (ref 0–1)
BASOPHILS ABSOLUTE: 0 K/UL (ref 0–0.2)
BUN BLDV-MCNC: 10 MG/DL (ref 6–20)
BUN/CREAT BLD: ABNORMAL (ref 9–20)
C DIFFICILE TOXINS, PCR: ABNORMAL
CALCIUM SERPL-MCNC: 8.5 MG/DL (ref 8.6–10.4)
CHLORIDE BLD-SCNC: 102 MMOL/L (ref 98–107)
CO2: 33 MMOL/L (ref 20–31)
CREAT SERPL-MCNC: <0.4 MG/DL (ref 0.5–0.9)
DIFFERENTIAL TYPE: ABNORMAL
EOSINOPHILS RELATIVE PERCENT: 0 % (ref 1–7)
GFR AFRICAN AMERICAN: ABNORMAL ML/MIN
GFR NON-AFRICAN AMERICAN: ABNORMAL ML/MIN
GFR SERPL CREATININE-BSD FRML MDRD: ABNORMAL ML/MIN/{1.73_M2}
GFR SERPL CREATININE-BSD FRML MDRD: ABNORMAL ML/MIN/{1.73_M2}
GLUCOSE BLD-MCNC: 161 MG/DL (ref 65–105)
GLUCOSE BLD-MCNC: 184 MG/DL (ref 70–99)
GLUCOSE BLD-MCNC: 190 MG/DL (ref 65–105)
HCT VFR BLD CALC: 30.9 % (ref 36.3–47.1)
HEMOGLOBIN: 8.8 G/DL (ref 11.9–15.1)
IMMATURE GRANULOCYTES: 0 %
LYMPHOCYTES # BLD: 30 % (ref 16–46)
MCH RBC QN AUTO: 22 PG (ref 25.2–33.5)
MCHC RBC AUTO-ENTMCNC: 28.5 G/DL (ref 25.2–33.5)
MCV RBC AUTO: 77.3 FL (ref 82.6–102.9)
MONOCYTES # BLD: 3 % (ref 4–11)
MORPHOLOGY: ABNORMAL
NRBC AUTOMATED: 1.8 PER 100 WBC
NUCLEATED RED BLOOD CELLS: 2 PER 100 WBC
PDW BLD-RTO: 18.9 % (ref 11.8–14.4)
PLATELET # BLD: 59 K/UL (ref 138–453)
PLATELET ESTIMATE: ABNORMAL
PMV BLD AUTO: 10.1 FL (ref 8.1–13.5)
POTASSIUM SERPL-SCNC: 3.6 MMOL/L (ref 3.7–5.3)
RBC # BLD: 4 M/UL (ref 3.95–5.11)
RBC # BLD: ABNORMAL 10*6/UL
SEG NEUTROPHILS: 52 % (ref 43–77)
SEGMENTED NEUTROPHILS ABSOLUTE COUNT: 0.57 K/UL (ref 1.8–7.7)
SODIUM BLD-SCNC: 146 MMOL/L (ref 135–144)
SPECIMEN DESCRIPTION: ABNORMAL
WBC # BLD: 1.1 K/UL (ref 3.5–11.3)
WBC # BLD: ABNORMAL 10*3/UL

## 2020-01-09 PROCEDURE — 6360000002 HC RX W HCPCS: Performed by: INTERNAL MEDICINE

## 2020-01-09 PROCEDURE — 6360000002 HC RX W HCPCS: Performed by: NURSE PRACTITIONER

## 2020-01-09 PROCEDURE — 94760 N-INVAS EAR/PLS OXIMETRY 1: CPT

## 2020-01-09 PROCEDURE — 99238 HOSP IP/OBS DSCHRG MGMT 30/<: CPT | Performed by: INTERNAL MEDICINE

## 2020-01-09 PROCEDURE — 6370000000 HC RX 637 (ALT 250 FOR IP): Performed by: INTERNAL MEDICINE

## 2020-01-09 PROCEDURE — 6370000000 HC RX 637 (ALT 250 FOR IP): Performed by: NURSE PRACTITIONER

## 2020-01-09 PROCEDURE — 80048 BASIC METABOLIC PNL TOTAL CA: CPT

## 2020-01-09 PROCEDURE — 2580000003 HC RX 258: Performed by: NURSE PRACTITIONER

## 2020-01-09 PROCEDURE — 85025 COMPLETE CBC W/AUTO DIFF WBC: CPT

## 2020-01-09 PROCEDURE — 82947 ASSAY GLUCOSE BLOOD QUANT: CPT

## 2020-01-09 PROCEDURE — 2500000003 HC RX 250 WO HCPCS: Performed by: NURSE PRACTITIONER

## 2020-01-09 PROCEDURE — 36415 COLL VENOUS BLD VENIPUNCTURE: CPT

## 2020-01-09 RX ORDER — FERROUS SULFATE 325(65) MG
325 TABLET ORAL DAILY
Qty: 30 TABLET | Refills: 0 | Status: SHIPPED | OUTPATIENT
Start: 2020-01-10

## 2020-01-09 RX ORDER — CEFDINIR 300 MG/1
300 CAPSULE ORAL 2 TIMES DAILY
Qty: 4 CAPSULE | Refills: 0 | Status: SHIPPED | OUTPATIENT
Start: 2020-01-09 | End: 2020-01-11

## 2020-01-09 RX ORDER — VANCOMYCIN HYDROCHLORIDE 125 MG/1
125 CAPSULE ORAL 4 TIMES DAILY
Qty: 56 CAPSULE | Refills: 0 | Status: SHIPPED | OUTPATIENT
Start: 2020-01-09 | End: 2020-01-23

## 2020-01-09 RX ORDER — METOPROLOL TARTRATE 50 MG/1
50 TABLET, FILM COATED ORAL 2 TIMES DAILY
Status: DISCONTINUED | OUTPATIENT
Start: 2020-01-09 | End: 2020-01-09 | Stop reason: HOSPADM

## 2020-01-09 RX ORDER — METOPROLOL TARTRATE 50 MG/1
50 TABLET, FILM COATED ORAL 2 TIMES DAILY
Qty: 60 TABLET | Refills: 0 | Status: SHIPPED | OUTPATIENT
Start: 2020-01-09

## 2020-01-09 RX ORDER — INSULIN GLARGINE 100 [IU]/ML
40 INJECTION, SOLUTION SUBCUTANEOUS NIGHTLY
Qty: 1 VIAL | Refills: 0 | Status: SHIPPED | OUTPATIENT
Start: 2020-01-09

## 2020-01-09 RX ORDER — LACTOBACILLUS RHAMNOSUS GG 10B CELL
1 CAPSULE ORAL 3 TIMES DAILY
Qty: 90 CAPSULE | Refills: 0 | COMMUNITY
Start: 2020-01-09

## 2020-01-09 RX ORDER — SACCHAROMYCES BOULARDII 250 MG
250 CAPSULE ORAL 2 TIMES DAILY
Qty: 14 CAPSULE | Refills: 0 | COMMUNITY
Start: 2020-01-09 | End: 2020-01-16

## 2020-01-09 RX ORDER — NICOTINE 21 MG/24HR
1 PATCH, TRANSDERMAL 24 HOURS TRANSDERMAL DAILY
Qty: 30 PATCH | Refills: 3 | Status: SHIPPED | OUTPATIENT
Start: 2020-01-10

## 2020-01-09 RX ORDER — AMLODIPINE BESYLATE 5 MG/1
10 TABLET ORAL NIGHTLY
Status: DISCONTINUED | OUTPATIENT
Start: 2020-01-09 | End: 2020-01-09 | Stop reason: HOSPADM

## 2020-01-09 RX ORDER — LACTOBACILLUS RHAMNOSUS GG 10B CELL
1 CAPSULE ORAL 3 TIMES DAILY
Status: DISCONTINUED | OUTPATIENT
Start: 2020-01-09 | End: 2020-01-09 | Stop reason: HOSPADM

## 2020-01-09 RX ORDER — POTASSIUM CHLORIDE 20 MEQ/1
40 TABLET, EXTENDED RELEASE ORAL ONCE
Status: COMPLETED | OUTPATIENT
Start: 2020-01-09 | End: 2020-01-09

## 2020-01-09 RX ORDER — AMLODIPINE BESYLATE 10 MG/1
10 TABLET ORAL NIGHTLY
Qty: 30 TABLET | Refills: 0 | Status: SHIPPED | OUTPATIENT
Start: 2020-01-09

## 2020-01-09 RX ORDER — SACCHAROMYCES BOULARDII 250 MG
250 CAPSULE ORAL 2 TIMES DAILY
Status: DISCONTINUED | OUTPATIENT
Start: 2020-01-09 | End: 2020-01-09 | Stop reason: HOSPADM

## 2020-01-09 RX ADMIN — Medication 125 MG: at 18:05

## 2020-01-09 RX ADMIN — Medication 10 ML: at 15:20

## 2020-01-09 RX ADMIN — POTASSIUM CHLORIDE 10 MEQ: 7.46 INJECTION, SOLUTION INTRAVENOUS at 01:19

## 2020-01-09 RX ADMIN — Medication 10 ML: at 08:30

## 2020-01-09 RX ADMIN — METOPROLOL TARTRATE 5 MG: 1 INJECTION, SOLUTION INTRAVENOUS at 06:52

## 2020-01-09 RX ADMIN — METOPROLOL TARTRATE 25 MG: 25 TABLET ORAL at 09:02

## 2020-01-09 RX ADMIN — POTASSIUM CHLORIDE 10 MEQ: 7.46 INJECTION, SOLUTION INTRAVENOUS at 04:43

## 2020-01-09 RX ADMIN — Medication 10 ML: at 06:55

## 2020-01-09 RX ADMIN — FERROUS SULFATE TAB 325 MG (65 MG ELEMENTAL FE) 325 MG: 325 (65 FE) TAB at 08:22

## 2020-01-09 RX ADMIN — Medication 1 CAPSULE: at 15:17

## 2020-01-09 RX ADMIN — POTASSIUM CHLORIDE 10 MEQ: 7.46 INJECTION, SOLUTION INTRAVENOUS at 02:27

## 2020-01-09 RX ADMIN — TBO-FILGRASTIM 480 MCG: 480 INJECTION, SOLUTION SUBCUTANEOUS at 18:32

## 2020-01-09 RX ADMIN — POTASSIUM CHLORIDE 40 MEQ: 20 TABLET, EXTENDED RELEASE ORAL at 08:29

## 2020-01-09 RX ADMIN — Medication 125 MG: at 15:19

## 2020-01-09 RX ADMIN — CALCIUM POLYCARBOPHIL 625 MG: 625 TABLET ORAL at 08:23

## 2020-01-09 RX ADMIN — Medication 250 MG: at 15:17

## 2020-01-09 RX ADMIN — INSULIN LISPRO 1 UNITS: 100 INJECTION, SOLUTION INTRAVENOUS; SUBCUTANEOUS at 18:06

## 2020-01-09 RX ADMIN — INSULIN LISPRO 1 UNITS: 100 INJECTION, SOLUTION INTRAVENOUS; SUBCUTANEOUS at 08:23

## 2020-01-09 RX ADMIN — INSULIN LISPRO 13 UNITS: 100 INJECTION, SOLUTION INTRAVENOUS; SUBCUTANEOUS at 18:05

## 2020-01-09 RX ADMIN — INSULIN LISPRO 1 UNITS: 100 INJECTION, SOLUTION INTRAVENOUS; SUBCUTANEOUS at 12:22

## 2020-01-09 RX ADMIN — POTASSIUM CHLORIDE 40 MEQ: 20 TABLET, EXTENDED RELEASE ORAL at 08:22

## 2020-01-09 RX ADMIN — INSULIN LISPRO 13 UNITS: 100 INJECTION, SOLUTION INTRAVENOUS; SUBCUTANEOUS at 12:23

## 2020-01-09 RX ADMIN — LOSARTAN POTASSIUM 100 MG: 25 TABLET ORAL at 08:22

## 2020-01-09 RX ADMIN — METOPROLOL TARTRATE 25 MG: 25 TABLET ORAL at 08:23

## 2020-01-09 RX ADMIN — PANTOPRAZOLE SODIUM 40 MG: 40 TABLET, DELAYED RELEASE ORAL at 05:56

## 2020-01-09 RX ADMIN — INSULIN LISPRO 13 UNITS: 100 INJECTION, SOLUTION INTRAVENOUS; SUBCUTANEOUS at 08:23

## 2020-01-09 ASSESSMENT — PAIN SCALES - WONG BAKER
WONGBAKER_NUMERICALRESPONSE: 0

## 2020-01-09 ASSESSMENT — PAIN SCALES - GENERAL: PAINLEVEL_OUTOF10: 0

## 2020-01-09 NOTE — PROGRESS NOTES
Hospitalist Progress Note    Patient:  Zain Tavarez     YOB: 1990    MRN: [id number]   Admit date: 1/6/2020     Acct: [de-identified]     PCP: Shannen Lerma    CC--Interval History:   C. difficle--[+]---POA-----started on Vancomycin PO-----probiotics    UTI---POA---Rocephin IV ---> Omnicef PO    DM2----Lantus ---log    K = 3.6 -----> potassium replacement    HTN--increased Norvasc and Lopressor    Pancytopenia---see below---WBC  1.0 ---> 1.1--Granix 480 today    Tobacco abuse--cessation--nicotine patch    Home----1.9.2020    See note below    All other ROS negative except noted in HPI    Diet:  DIET CARDIAC; Carb Control: 4 carb choices (60 gms)/meal    Medications:  Scheduled Meds:   amLODIPine  10 mg Oral Nightly    metoprolol tartrate  50 mg Oral BID    insulin lispro  13 Units Subcutaneous TID WC    insulin glargine  40 Units Subcutaneous Nightly    ferrous sulfate  325 mg Oral Daily    losartan  100 mg Oral Daily    polycarbophil  625 mg Oral Daily    potassium chloride  40 mEq Oral BID    nicotine  1 patch Transdermal Daily    pantoprazole  40 mg Oral QAM AC    sodium chloride flush  10 mL Intravenous 2 times per day    insulin lispro  0-6 Units Subcutaneous TID WC    insulin lispro  0-3 Units Subcutaneous Nightly    cefTRIAXone (ROCEPHIN) IV  1 g Intravenous Q24H     Continuous Infusions:   dextrose       PRN Meds:ibuprofen, ondansetron, cyclobenzaprine, potassium chloride **OR** potassium alternative oral replacement **OR** potassium chloride, sodium chloride flush, acetaminophen, glucose, dextrose, glucagon (rDNA), dextrose, metoprolol    Objective:  Labs:  CBC with Differential:    Lab Results   Component Value Date    WBC 1.1 01/09/2020    RBC 4.00 01/09/2020    HGB 8.8 01/09/2020    HCT 30.9 01/09/2020    PLT 59 01/09/2020    MCV 77.3 01/09/2020    MCH 22.0 01/09/2020    MCHC 28.5 01/09/2020    RDW 18.9 01/09/2020    NRBC 2 01/09/2020    LYMPHOPCT 30 01/09/2020    MONOT 3 01/09/2020    BASOPCT 0 01/09/2020    MONOSABS 0.03 01/09/2020    LYMPHSABS 0.32 01/09/2020    EOSABS 0.00 01/09/2020    BASOSABS 0.00 01/09/2020    DIFFTYPE NOT REPORTED 01/09/2020     BMP:    Lab Results   Component Value Date     01/09/2020    K 3.6 01/09/2020     01/09/2020    CO2 33 01/09/2020    BUN 10 01/09/2020    LABALBU 3.1 01/07/2020    CREATININE <0.40 01/09/2020    CALCIUM 8.5 01/09/2020    GFRAA CANNOT BE CALCULATED 01/09/2020    LABGLOM CANNOT BE CALCULATED 01/09/2020    GLUCOSE 184 01/09/2020           Physical Exam:  Vitals: BP (!) 198/111   Pulse 111   Temp 98.2 °F (36.8 °C) (Oral)   Resp 20   Ht 5' 6\" (1.676 m)   Wt (!) 308 lb (139.7 kg)   SpO2 95%   BMI 49.71 kg/m²   24 hour intake/output:    Intake/Output Summary (Last 24 hours) at 1/9/2020 1250  Last data filed at 1/9/2020 0557  Gross per 24 hour   Intake 3211 ml   Output --   Net 3211 ml     Last 3 weights: Wt Readings from Last 3 Encounters:   01/09/20 (!) 308 lb (139.7 kg)   12/16/19 (!) 315 lb (142.9 kg)   12/09/19 (!) 315 lb (142.9 kg)     HEENT: Normocephalic and Atraumatic  Neck: Supple, No Masses, Tenderness, Nodularity and No Lymphadenopathy  Chest/Lungs: Clear to Auscultation without Rales, Rhonchi, or Wheezes  Cardiac: Regular Rate and Rhythm  GI/Abdomen: Bowel Sounds Present and Soft, Non-tender, without Guarding or Rebound Tenderness  : Not examined  EXT/Skin: no rashes----multiple tattoos none infected appearing----, No Edema, No Cyanosis and No Clubbing  Neuro: Alert and Oriented and No Localizing Signs/Symptoms      Assessment:    Active Problems:    UTI (urinary tract infection)    Lightheadedness    Type 2 diabetes mellitus with hyperglycemia, without long-term current use of insulin (HCC)  Resolved Problems:    * No resolved hospital problems.  *    BIRR,   LESLI                   mdh               dc  world call  29    NEFTALI [facility]en--[place]]  FULL CODE         Anti-infectives:  Rocephin IV, Vancomycin PO    UTI---POA---1.6.2020----hematuria  C. DIFFICLE--tox--[+]---1.6.2020---diarrhea  Sepsis ruled out---1.6.2020  Diabetes Mellitus Type 2----newly diagnosed  Cervical carcinoma---metastatic---no surgery---currently receiving                     chemotherapy with anticipated subsequent XRT  Anti-neoplastic induced pancytopenia----1.6.2020----Granix 480---1.8.2020 + 1.9.2020  Dehydration   Hypertension   Sinus tachycardia         EKG---1.6.2020---sinus tachycardia---125--ADDIE--LVH---NSSTTCs  CKD---at least Stage 2  Morbid obesity   Anxiety   Tobacco abuse----1 PPD---quit--12.31.2019  Hypokalemia   Hypomagnesemia   PMH:    endometriosis   PSH:    cholecystectomy    Allergies:  Gentamycin      Patient received smoking cessation information during this hospitalization. Pt was given the [facility] 57 number. Smoking cessation medication patient was given at discharge:  Nicotine Patch    Reason no smoking cessation medication given Not Applicable                  Plan:  1. Home---1.9.2020  2. UTI--POA---Omnicef---probiotics  3. DM2---Lantus 40 PM---log 13-13-13 and correction  4. Probiotics  5.   C. difficle---POA---Vancomycin PO  6. Tobacco abuse--cessation--nicotine patch  7. Potassium replacement  8. HTN---Norvasc 10 daily----Lopressor 50 BID  9. Follow up NEFTALI Ke[place]---[place]  10. Follow up Oncology  11.   See orders     Electronically signed by Aruna Christiansen on 1/9/2020 at 12:50 PM    Hospitalist

## 2020-01-09 NOTE — FLOWSHEET NOTE
Repeat k level was 2.7. Order received from GOLDIE Hernandez NP to give an additional 60 mEq orally and give ss KCl riders per protocol. Hilary Zapien

## 2020-01-09 NOTE — PROGRESS NOTES
Writer offered to help set up patient to shower. Patient stated she showered yesterday so she didn't want one at this time. Writer asked patient to let her know if she changed her mind.

## 2020-01-11 NOTE — DISCHARGE SUMMARY
Wilfredo [address]                 [address] [place] [address], [address] [place] [address]                               DISCHARGE SUMMARY    PATIENT NAME: Tejas Gomez                        :        1990  MED REC NO:   [id number]                             ROOM:       0210  ACCOUNT NO:   [de-identified]                           ADMIT DATE: 2020  PROVIDER:     Andrew Ivey                  DISCHARGE DATE: 2020    ATTENDING PHYSICIAN OF HOSPITALIZATION:  Ismael Cherry MD    PERSONAL PHYSICIAN:  Dora Martinez, [facility]. The patient was seen by Hematology/Oncology. DIAGNOSES:  1. Urinary tract infection, POA, 2020 with hematuria. 2.  C. difficile tox positive, 2020, diarrhea. 3.  Sepsis ruled out, 2020.  4.  Diabetes mellitus type 2, newly diagnosed. 5.  Cervical carcinoma, metastatic, no surgery, currently receiving  chemotherapy with anticipated subsequent XRT. 6.  Antineoplastic-induced pancytopenia, 2020, Gran[address],  2020 and 2020; dehydration, corrected. 7.  Hypertension. 8.  Sinus tachycardia, EKG, 2020. Sinus tachycardia, rate 125,  ADDIE, LVH, nonspecific ST-T changes. 9.  Chronic kidney disease, at least stage II.  10.  Morbid obesity. 11. Anxiety. 12.  Tobacco abuse, 1 pack a day, quit 2019. 13.  Hypokalemia. 14.  Hypomagnesemia, replacement therapy is given. HISTORY OF PRESENT ILLNESS AND HOSPITAL COURSE:  A 50-year-old white  female, patient of Dora Martinez, [facility], [address] [place] [address] [address]. The patient  was seen by Hematology/Oncology. Cervical carcinoma with metastatic disease    The patient presented with a urinary  tract infection, treated with Rocephin. The patient had diarrhea,C. difficile colitis. This is being treated  with vancomycin p.o. Sepsis is ruled out. Newly diagnosed diabetic.   The patient was seen by diabetic educator,  placed on insulin, see doses

## 2020-01-12 LAB
CULTURE: NORMAL
CULTURE: NORMAL
Lab: NORMAL
Lab: NORMAL
SPECIMEN DESCRIPTION: NORMAL
SPECIMEN DESCRIPTION: NORMAL

## 2020-02-05 PROBLEM — N39.0 UTI (URINARY TRACT INFECTION): Status: RESOLVED | Noted: 2020-01-06 | Resolved: 2020-02-05

## 2021-01-24 ENCOUNTER — HOSPITAL ENCOUNTER (EMERGENCY)
Age: 31
Discharge: HOME | End: 2021-01-24
Attending: EMERGENCY MEDICINE
Payer: MEDICARE

## 2021-01-24 ENCOUNTER — APPOINTMENT (OUTPATIENT)
Dept: INTERVENTIONAL RADIOLOGY/VASCULAR | Age: 31
End: 2021-01-24
Payer: MEDICARE

## 2021-01-24 VITALS
SYSTOLIC BLOOD PRESSURE: 118 MMHG | TEMPERATURE: 97.8 F | HEART RATE: 79 BPM | RESPIRATION RATE: 14 BRPM | WEIGHT: 293 LBS | DIASTOLIC BLOOD PRESSURE: 97 MMHG | BODY MASS INDEX: 48.42 KG/M2 | OXYGEN SATURATION: 99 %

## 2021-01-24 DIAGNOSIS — M54.32 SCIATICA OF LEFT SIDE: Primary | ICD-10-CM

## 2021-01-24 LAB
-: ABNORMAL
ABSOLUTE EOS #: 0.11 K/UL (ref 0–0.44)
ABSOLUTE IMMATURE GRANULOCYTE: 0.14 K/UL (ref 0–0.3)
ABSOLUTE LYMPH #: 0.6 K/UL (ref 1.1–3.7)
ABSOLUTE MONO #: 0.54 K/UL (ref 0.1–1.2)
ALBUMIN SERPL-MCNC: 3.7 G/DL (ref 3.5–5.2)
ALBUMIN/GLOBULIN RATIO: 1.1 (ref 1–2.5)
ALP BLD-CCNC: 93 U/L (ref 35–104)
ALT SERPL-CCNC: 8 U/L (ref 5–33)
AMORPHOUS: ABNORMAL
ANION GAP SERPL CALCULATED.3IONS-SCNC: 8 MMOL/L (ref 9–17)
AST SERPL-CCNC: 10 U/L
BACTERIA: ABNORMAL
BASOPHILS # BLD: 0 % (ref 0–2)
BASOPHILS ABSOLUTE: <0.03 K/UL (ref 0–0.2)
BILIRUB SERPL-MCNC: 0.18 MG/DL (ref 0.3–1.2)
BILIRUBIN URINE: NEGATIVE
BUN BLDV-MCNC: 18 MG/DL (ref 6–20)
BUN/CREAT BLD: 31 (ref 9–20)
CALCIUM SERPL-MCNC: 9.1 MG/DL (ref 8.6–10.4)
CASTS UA: ABNORMAL /LPF (ref 0–2)
CHLORIDE BLD-SCNC: 106 MMOL/L (ref 98–107)
CO2: 24 MMOL/L (ref 20–31)
COLOR: ABNORMAL
COMMENT UA: ABNORMAL
CREAT SERPL-MCNC: 0.59 MG/DL (ref 0.5–0.9)
CRYSTALS, UA: ABNORMAL /HPF
DIFFERENTIAL TYPE: ABNORMAL
EOSINOPHILS RELATIVE PERCENT: 1 % (ref 1–4)
EPITHELIAL CELLS UA: ABNORMAL /HPF (ref 0–5)
GFR AFRICAN AMERICAN: >60 ML/MIN
GFR NON-AFRICAN AMERICAN: >60 ML/MIN
GFR SERPL CREATININE-BSD FRML MDRD: ABNORMAL ML/MIN/{1.73_M2}
GFR SERPL CREATININE-BSD FRML MDRD: ABNORMAL ML/MIN/{1.73_M2}
GLUCOSE BLD-MCNC: 117 MG/DL (ref 70–99)
GLUCOSE URINE: NEGATIVE
HCT VFR BLD CALC: 40.5 % (ref 36.3–47.1)
HEMOGLOBIN: 11.8 G/DL (ref 11.9–15.1)
IMMATURE GRANULOCYTES: 2 %
KETONES, URINE: NEGATIVE
LEUKOCYTE ESTERASE, URINE: NEGATIVE
LYMPHOCYTES # BLD: 7 % (ref 24–43)
MCH RBC QN AUTO: 26.6 PG (ref 25.2–33.5)
MCHC RBC AUTO-ENTMCNC: 29.1 G/DL (ref 25.2–33.5)
MCV RBC AUTO: 91.2 FL (ref 82.6–102.9)
MONOCYTES # BLD: 6 % (ref 3–12)
MUCUS: ABNORMAL
NITRITE, URINE: NEGATIVE
NRBC AUTOMATED: 0 PER 100 WBC
OTHER OBSERVATIONS UA: ABNORMAL
PDW BLD-RTO: 17.3 % (ref 11.8–14.4)
PH UA: 6 (ref 5–6)
PLATELET # BLD: 251 K/UL (ref 138–453)
PLATELET ESTIMATE: ABNORMAL
PMV BLD AUTO: 10 FL (ref 8.1–13.5)
POTASSIUM SERPL-SCNC: 4.4 MMOL/L (ref 3.7–5.3)
PROTEIN UA: NEGATIVE
RBC # BLD: 4.44 M/UL (ref 3.95–5.11)
RBC # BLD: ABNORMAL 10*6/UL
RBC UA: ABNORMAL /HPF (ref 0–4)
RENAL EPITHELIAL, UA: ABNORMAL /HPF
SEG NEUTROPHILS: 84 % (ref 36–65)
SEGMENTED NEUTROPHILS ABSOLUTE COUNT: 7.18 K/UL (ref 1.5–8.1)
SODIUM BLD-SCNC: 138 MMOL/L (ref 135–144)
SPECIFIC GRAVITY UA: 1.03 (ref 1.01–1.02)
TOTAL PROTEIN: 7.2 G/DL (ref 6.4–8.3)
TRICHOMONAS: ABNORMAL
TURBIDITY: ABNORMAL
URINE HGB: NEGATIVE
UROBILINOGEN, URINE: NORMAL
WBC # BLD: 8.6 K/UL (ref 3.5–11.3)
WBC # BLD: ABNORMAL 10*3/UL
WBC UA: ABNORMAL /HPF (ref 0–4)
YEAST: ABNORMAL

## 2021-01-24 PROCEDURE — 81001 URINALYSIS AUTO W/SCOPE: CPT

## 2021-01-24 PROCEDURE — 93971 EXTREMITY STUDY: CPT

## 2021-01-24 PROCEDURE — 36415 COLL VENOUS BLD VENIPUNCTURE: CPT

## 2021-01-24 PROCEDURE — 85025 COMPLETE CBC W/AUTO DIFF WBC: CPT

## 2021-01-24 PROCEDURE — 99284 EMERGENCY DEPT VISIT MOD MDM: CPT

## 2021-01-24 PROCEDURE — 80053 COMPREHEN METABOLIC PANEL: CPT

## 2021-01-24 PROCEDURE — 96372 THER/PROPH/DIAG INJ SC/IM: CPT

## 2021-01-24 PROCEDURE — 6360000002 HC RX W HCPCS: Performed by: EMERGENCY MEDICINE

## 2021-01-24 RX ORDER — PREDNISONE 20 MG/1
TABLET ORAL
Qty: 10 TABLET | Refills: 0 | Status: SHIPPED | OUTPATIENT
Start: 2021-01-24

## 2021-01-24 RX ORDER — TRAMADOL HYDROCHLORIDE 50 MG/1
50 TABLET ORAL 3 TIMES DAILY PRN
Qty: 15 TABLET | Refills: 0 | Status: SHIPPED | OUTPATIENT
Start: 2021-01-24 | End: 2021-01-29

## 2021-01-24 RX ORDER — PROMETHAZINE HYDROCHLORIDE 25 MG/ML
25 INJECTION, SOLUTION INTRAMUSCULAR; INTRAVENOUS ONCE
Status: COMPLETED | OUTPATIENT
Start: 2021-01-24 | End: 2021-01-24

## 2021-01-24 RX ADMIN — HYDROMORPHONE HYDROCHLORIDE 1.5 MG: 1 INJECTION, SOLUTION INTRAMUSCULAR; INTRAVENOUS; SUBCUTANEOUS at 09:48

## 2021-01-24 RX ADMIN — PROMETHAZINE HYDROCHLORIDE 25 MG: 25 INJECTION INTRAMUSCULAR; INTRAVENOUS at 09:49

## 2021-01-24 ASSESSMENT — PAIN DESCRIPTION - ORIENTATION: ORIENTATION: LEFT

## 2021-01-24 ASSESSMENT — ENCOUNTER SYMPTOMS
SHORTNESS OF BREATH: 0
BACK PAIN: 1

## 2021-01-24 ASSESSMENT — PAIN SCALES - GENERAL
PAINLEVEL_OUTOF10: 9
PAINLEVEL_OUTOF10: 9
PAINLEVEL_OUTOF10: 4

## 2021-01-24 ASSESSMENT — PAIN DESCRIPTION - PROGRESSION: CLINICAL_PROGRESSION: NOT CHANGED

## 2021-01-24 ASSESSMENT — PAIN DESCRIPTION - PAIN TYPE
TYPE: ACUTE PAIN
TYPE: ACUTE PAIN

## 2021-01-24 ASSESSMENT — PAIN - FUNCTIONAL ASSESSMENT: PAIN_FUNCTIONAL_ASSESSMENT: ACTIVITIES ARE NOT PREVENTED

## 2021-01-24 ASSESSMENT — PAIN DESCRIPTION - DESCRIPTORS: DESCRIPTORS: SHOOTING;SHARP

## 2021-01-24 ASSESSMENT — PAIN DESCRIPTION - FREQUENCY: FREQUENCY: INTERMITTENT

## 2021-01-24 NOTE — ED PROVIDER NOTES
[facility] ED  EMERGENCY DEPARTMENT ENCOUNTER      Pt Name: Camden Marshall  MRN: [id number]  [place] 1990  Date of evaluation: 1/24/2021  Provider: Mayra Shin MD    [address] [place] [address]     Chief Complaint   Patient presents with    Leg Pain     left    Flank Pain     left         HISTORY OF PRESENT ILLNESS   (Location/Symptom, Timing/Onset, Context/Setting,Quality, Duration, Modifying Factors, Severity)  Note limiting factors. Camden Marshall is a 35 y.o. female who presents to the emergency department with a chief complaint of left leg pain and swelling. Patient was originally diagnosed with cancer of the cervix towards the end of 2019 and was treated with chemotherapy and radiation therapy. She states she was in remission for 3 months after which she was found to have some positive nodes in her chest for which she again underwent radiation therapy. Since then she has developed left-sided lymph node involvement in her abdomen. She also has low back pain on the left side which radiates to the left leg. Her GYN oncologist prescribes tramadol which helps her but she has now run out of it. She does not report midline thoracic or lumbar spine pain and has not been experiencing recent chills or fever. She had become diabetic with steroid therapy during her original chemotherapy but states that her diabetes since then has resolved as she is currently not on steroids. She reports recent weight gain also. The history is provided by the patient and medical records. Nursing Notes werereviewed. REVIEW OF SYSTEMS    (2-9 systems for level 4, 10 or more for level 5)     Review of Systems   Constitutional: Negative for chills and fever. Respiratory: Negative for shortness of breath. Musculoskeletal: Positive for arthralgias and back pain. All other systems reviewed and are negative. Except as noted above the remainder of the review of systems was reviewed and negative.        PAST MEDICAL HISTORY     Past Medical History:   Diagnosis Date    Chlamydia     Endometriosis     Hypertension     Hypokalemia          SURGICALHISTORY       Past Surgical History:   Procedure Laterality Date    CHOLECYSTECTOMY      GALLBLADDER SURGERY           CURRENT MEDICATIONS       Discharge Medication List as of 1/24/2021 10:27 AM      CONTINUE these medications which have NOT CHANGED    Details   insulin glargine (LANTUS) 100 UNIT/ML injection vial Inject 40 Units into the skin nightly, Disp-1 vial, R-0Normal      !! insulin lispro (HUMALOG) 100 UNIT/ML injection vial Inject 13 Units into the skin 3 times daily (with meals), Disp-1 vial, R-0Normal      !! insulin lispro (HUMALOG) 100 UNIT/ML injection vial Inject 0-3 Units into the skin nightly, Disp-1 vial, R-0NO PRINT      !! insulin lispro (HUMALOG) 100 UNIT/ML injection vial Inject 0-6 Units into the skin 3 times daily (with meals), Disp-1 vial, R-0NO PRINT      lactobacillus (CULTURELLE) CAPS capsule Take 1 capsule by mouth 3 times daily, Disp-90 capsule, R-0OTC      metoprolol tartrate (LOPRESSOR) 50 MG tablet Take 1 tablet by mouth 2 times daily, Disp-60 tablet, R-0Normal      amLODIPine (NORVASC) 10 MG tablet Take 1 tablet by mouth nightly, Disp-30 tablet, R-0Normal      ferrous sulfate 325 (65 Fe) MG tablet Take 1 tablet by mouth daily, Disp-30 tablet, R-0Normal      nicotine (NICODERM CQ) 21 MG/24HR Place 1 patch onto the skin daily, Disp-30 patch, R-3Normal      LORazepam (ATIVAN) 1 MG tablet Take 1 mg by mouth every 6 hours as needed for Anxiety. Historical Med      spironolactone-hydrochlorothiazide (ALDACTAZIDE) 25-25 MG per tablet Take 1 tablet by mouth dailyHistorical Med      Calcium Polycarbophil (FIBER-CAPS PO) Take by mouthHistorical Med      Black Elderberry (SAMBUCUS ELDERBERRY PO) Take by mouthHistorical Med      dexamethasone (DECADRON) 4 MG tablet Take 4 mg by mouth 2 times daily (with meals) Takes 4mg daily on days 4, 5, 6 of chemoHistorical Med      prochlorperazine (COMPAZINE) 10 MG tablet Take 10 mg by mouth every 6 hours as needed (mild nausea or vomiting)Historical Med      ondansetron (ZOFRAN) 4 MG tablet Take 1 tablet by mouth every 8 hours as needed for Nausea, Disp-10 tablet, R-0Print      potassium chloride (KLOR-CON M) 20 MEQ extended release tablet Take 1 tablet by mouth daily, Disp-10 tablet, R-0Print      losartan (COZAAR) 100 MG tablet Take 100 mg by mouthHistorical Med      cyclobenzaprine (FLEXERIL) 10 MG tablet Take 10 mg by mouth 3 times daily as needed for Muscle spasmsHistorical Med       !! - Potential duplicate medications found. Please discuss with provider. ALLERGIES     Gentamicin    FAMILY HISTORY     History reviewed. No pertinent family history.        SOCIAL HISTORY       Social History     Socioeconomic History    Marital status: Single     Spouse name: None    Number of children: None    Years of education: None    Highest education level: None   Occupational History    None   Social Needs    Financial resource strain: None    Food insecurity     Worry: None     Inability: None    Transportation needs     Medical: None     Non-medical: None   Tobacco Use    Smoking status: Current Every Day Smoker     Packs/day: 1.00    Smokeless tobacco: Never Used   Substance and Sexual Activity    Alcohol use: Not Currently    Drug use: Never    Sexual activity: Yes     Partners: Male   Lifestyle    Physical activity     Days per week: None     Minutes per session: None    Stress: None   Relationships    Social connections     Talks on phone: None     Gets together: None     Attends Rastafari service: None     Active member of club or organization: None     Attends meetings of clubs or organizations: None     Relationship status: None    Intimate partner violence     Fear of current or ex partner: None     Emotionally abused: None     Physically abused: None     Forced sexual activity: None Other Topics Concern    None   Social History Narrative    None       SCREENINGS    [place] Coma Scale  Eye Opening: Spontaneous  Best Verbal Response: Oriented  Best Motor Response: Obeys commands  [place] Coma Scale Score: 15        PHYSICAL EXAM    (up to 7 for level 4, 8 or more for level 5)     ED Triage Vitals [01/24/21 0841]   BP Temp Temp Source Pulse Resp SpO2 Height Weight   -- 97.8 °F (36.6 °C) Tympanic 95 16 99 % -- 300 lb (136.1 kg)       Physical Exam  Vitals signs reviewed. Constitutional:       Comments: Appears in mild discomfort due to pain. HENT:      Head: Normocephalic. Nose: Nose normal.   Eyes:      Extraocular Movements: Extraocular movements intact. Neck:      Musculoskeletal: Neck supple. Cardiovascular:      Rate and Rhythm: Normal rate and regular rhythm. Pulmonary:      Effort: Pulmonary effort is normal.      Breath sounds: Normal breath sounds. Abdominal:      Palpations: Abdomen is soft. Musculoskeletal:      Comments: Patient is morbidly obese but I can make out slightly increased edema of the left leg and compared to the right. She localizes tenderness mostly left of the midline in the lower back but not focal midline bony tenderness in the lumbar spine. No overlying skin color or temperature changes are noted over the lower back. Range of motion in the left leg is intact but accompanied with pain. Neurovascular function is intact distally. Skin:     General: Skin is warm and dry. Coloration: Skin is not pale. Neurological:      General: No focal deficit present. Mental Status: She is alert and oriented to person, place, and time.          DIAGNOSTIC RESULTS     EKG: All EKG's are interpreted by the Emergency Department Physician who either signs orCo-signs this chart in the absence of a cardiologist.    RADIOLOGY:   Non-plain film images such as CT, Ultrasound and MRI are read by the radiologist. Plain radiographic images are visualized and preliminarily interpreted by the emergency physician with the below findings    Interpretation per the Radiologist below, ifavailable at the time of this note:    VL DUP LOWER EXTREMITY VENOUS LEFT   Final Result   No evidence of DVT in the left lower extremity. ED BEDSIDE ULTRASOUND:   Performed by ED Physician - none    LABS:  Labs Reviewed   URINE RT REFLEX TO CULTURE - Abnormal; Notable for the following components:       Result Value    Specific Gravity, UA 1.030 (*)     All other components within normal limits   COMPREHENSIVE METABOLIC PANEL W/ REFLEX TO MG FOR LOW K - Abnormal; Notable for the following components:    Glucose 117 (*)     Bun/Cre Ratio 31 (*)     Anion Gap 8 (*)     Total Bilirubin 0.18 (*)     All other components within normal limits   CBC WITH AUTO DIFFERENTIAL - Abnormal; Notable for the following components:    Hemoglobin 11.8 (*)     RDW 17.3 (*)     Seg Neutrophils 84 (*)     Lymphocytes 7 (*)     Immature Granulocytes 2 (*)     Absolute Lymph # 0.60 (*)     All other components within normal limits   MICROSCOPIC URINALYSIS - Abnormal; Notable for the following components:    Bacteria, UA TRACE (*)     Mucus, UA 1+ (*)     All other components within normal limits       All other labs were within normal range ornot returned as of this dictation. EMERGENCY DEPARTMENT COURSE and DIFFERENTIAL DIAGNOSIS/MDM:   Vitals:    Vitals:    01/24/21 0841 01/24/21 0845 01/24/21 1031   BP:  (!) 174/92 (!) 118/97   Pulse: 95  79   Resp: 16  14   Temp: 97.8 °F (36.6 °C)     TempSrc: Tympanic     SpO2: 99% 99% 99%   Weight: 300 lb (136.1 kg)              Urinalysis does not show obvious signs of infection. Baseline labs are unremarkable. A venous Doppler study was obtained of the left leg and DVT is ruled out. I suspect the left leg swelling is related to impaired lymphatic drainage on the left side and her pain is related to lumbar radiculopathy.   Patient states that tramadol has helped her immensely in the past but she has run out of it. She is placed on tramadol and prednisone burst of 40 mg daily for 5 days. In the emergency department, she was treated with Dilaudid 1.5 mg and Phenergan 25 mg IM, reporting little relief at the time of discharge. She is advised outpatient follow-up with her GYN oncologist and her primary care physician and is to return anytime for worsening symptoms. MDM    CONSULTS:  None    PROCEDURES:  Unlessotherwise noted below, none     Procedures    FINAL IMPRESSION      1. Sciatica of left side          DISPOSITION/PLAN   DISPOSITION Decision To Discharge 01/24/2021 10:23:47 AM      PATIENT REFERRED TO:  Sarahi Dupree  [address] [place] [address], [address]  [address] [facility]  [phone]    In 2 days      Shannon Reeves MD  [facility] [address] [facility] [address]  [phone]            DISCHARGE MEDICATIONS:         Problem List:  Patient Active Problem List   Diagnosis Code    Hypertension I10    Tobacco use Z72.0    Anxiety F41.9    Anemia D64.9    Malignant neoplasm of cervix ([facility] C53.9    Morbid obesity with BMI of 45.0-49.9, adult ([facility] E66.01, Z68.42    Lightheadedness R42    Type 2 diabetes mellitus with hyperglycemia, without long-term current use of insulin ([facility] E11.65           Summation      Patient Course: Discharged. ED Medicationsadministered this visit:    Medications   HYDROmorphone (DILAUDID) injection 1.5 mg (1.5 mg Intramuscular Given 1/24/21 0948)   promethazine (PHENERGAN) injection 25 mg (25 mg Intramuscular Given 1/24/21 0949)       New Prescriptions from this visit:    Discharge Medication List as of 1/24/2021 10:27 AM      START taking these medications    Details   traMADol (ULTRAM) 50 MG tablet Take 1 tablet by mouth 3 times daily as needed for Pain for up to 5 days. Intended supply: 3 days.  Take lowest dose possible to manage pain, Disp-15 tablet, R-0Print      predniSONE (DELTASONE) 20 MG tablet Take 2 tablets by mouth every morning till gone., Disp-10 tablet, R-0Normal             Follow-up:  Kayla Oliver  [address] [facility], [address] [facility],[address]    In 2 days      Milad Lopez MD  [address] [facility] [address] [address] [phone]              Final Impression:   1.  Sciatica of left side               (Please note that portions of this note were completed with a voice recognitionprogram.  Efforts were made to edit the dictations but occasionally words are mis-transcribed.)    Brea Aguiar MD (electronically signed)  Attending Emergency Physician            Brea Aguiar MD  01/24/21 9363

## 2021-02-10 ENCOUNTER — HOSPITAL ENCOUNTER (EMERGENCY)
Age: 31
Discharge: HOME | End: 2021-02-10
Attending: EMERGENCY MEDICINE
Payer: MEDICARE

## 2021-02-10 ENCOUNTER — APPOINTMENT (OUTPATIENT)
Dept: CT IMAGING | Age: 31
End: 2021-02-10
Payer: MEDICARE

## 2021-02-10 ENCOUNTER — APPOINTMENT (OUTPATIENT)
Dept: GENERAL RADIOLOGY | Age: 31
End: 2021-02-10
Payer: MEDICARE

## 2021-02-10 VITALS
HEART RATE: 104 BPM | DIASTOLIC BLOOD PRESSURE: 87 MMHG | TEMPERATURE: 97.8 F | RESPIRATION RATE: 14 BRPM | SYSTOLIC BLOOD PRESSURE: 170 MMHG | OXYGEN SATURATION: 97 % | BODY MASS INDEX: 48.42 KG/M2 | WEIGHT: 293 LBS

## 2021-02-10 DIAGNOSIS — N30.00 ACUTE CYSTITIS WITHOUT HEMATURIA: Primary | ICD-10-CM

## 2021-02-10 DIAGNOSIS — K59.00 CONSTIPATION, UNSPECIFIED CONSTIPATION TYPE: ICD-10-CM

## 2021-02-10 LAB
-: ABNORMAL
ABSOLUTE EOS #: 0.04 K/UL (ref 0–0.4)
ABSOLUTE IMMATURE GRANULOCYTE: 0.07 K/UL (ref 0–0.3)
ABSOLUTE LYMPH #: 0.48 K/UL (ref 1–4.8)
ABSOLUTE MONO #: 0.37 K/UL (ref 0.1–1.2)
ALBUMIN SERPL-MCNC: 3.8 G/DL (ref 3.5–5.2)
ALBUMIN/GLOBULIN RATIO: 1.1 (ref 1–2.5)
ALP BLD-CCNC: 74 U/L (ref 35–104)
ALT SERPL-CCNC: 15 U/L (ref 5–33)
AMORPHOUS: ABNORMAL
ANION GAP SERPL CALCULATED.3IONS-SCNC: 9 MMOL/L (ref 9–17)
AST SERPL-CCNC: 10 U/L
BACTERIA: ABNORMAL
BASOPHILS # BLD: 0 % (ref 0–1)
BASOPHILS ABSOLUTE: 0 K/UL (ref 0–0.2)
BILIRUB SERPL-MCNC: 0.26 MG/DL (ref 0.3–1.2)
BILIRUBIN URINE: NEGATIVE
BUN BLDV-MCNC: 15 MG/DL (ref 6–20)
BUN/CREAT BLD: 26 (ref 9–20)
CALCIUM SERPL-MCNC: 9.2 MG/DL (ref 8.6–10.4)
CASTS UA: ABNORMAL /LPF (ref 0–2)
CHLORIDE BLD-SCNC: 104 MMOL/L (ref 98–107)
CO2: 26 MMOL/L (ref 20–31)
COLOR: ABNORMAL
COMMENT UA: ABNORMAL
CREAT SERPL-MCNC: 0.57 MG/DL (ref 0.5–0.9)
CRYSTALS, UA: ABNORMAL /HPF
DIFFERENTIAL TYPE: ABNORMAL
EOSINOPHILS RELATIVE PERCENT: 2 % (ref 1–7)
EPITHELIAL CELLS UA: ABNORMAL /HPF (ref 0–5)
GFR AFRICAN AMERICAN: >60 ML/MIN
GFR NON-AFRICAN AMERICAN: >60 ML/MIN
GFR SERPL CREATININE-BSD FRML MDRD: ABNORMAL ML/MIN/{1.73_M2}
GFR SERPL CREATININE-BSD FRML MDRD: ABNORMAL ML/MIN/{1.73_M2}
GLUCOSE BLD-MCNC: 111 MG/DL (ref 70–99)
GLUCOSE URINE: NEGATIVE
HCG QUALITATIVE: NEGATIVE
HCT VFR BLD CALC: 36.3 % (ref 36.3–47.1)
HEMOGLOBIN: 10.7 G/DL (ref 11.9–15.1)
IMMATURE GRANULOCYTES: 3 %
KETONES, URINE: NEGATIVE
LEUKOCYTE ESTERASE, URINE: ABNORMAL
LIPASE: 32 U/L (ref 13–60)
LYMPHOCYTES # BLD: 22 % (ref 16–46)
MCH RBC QN AUTO: 26.6 PG (ref 25.2–33.5)
MCHC RBC AUTO-ENTMCNC: 29.5 G/DL (ref 25.2–33.5)
MCV RBC AUTO: 90.3 FL (ref 82.6–102.9)
MONOCYTES # BLD: 17 % (ref 4–11)
MORPHOLOGY: ABNORMAL
MORPHOLOGY: ABNORMAL
MUCUS: ABNORMAL
NITRITE, URINE: NEGATIVE
NRBC AUTOMATED: 0 PER 100 WBC
OTHER OBSERVATIONS UA: ABNORMAL
PDW BLD-RTO: 16.5 % (ref 11.8–14.4)
PH UA: 6 (ref 5–6)
PLATELET # BLD: 168 K/UL (ref 138–453)
PLATELET ESTIMATE: ABNORMAL
PMV BLD AUTO: 9.8 FL (ref 8.1–13.5)
POTASSIUM SERPL-SCNC: 4.2 MMOL/L (ref 3.7–5.3)
PROTEIN UA: ABNORMAL
RBC # BLD: 4.02 M/UL (ref 3.95–5.11)
RBC # BLD: ABNORMAL 10*6/UL
RBC UA: ABNORMAL /HPF (ref 0–4)
RENAL EPITHELIAL, UA: ABNORMAL /HPF
SEG NEUTROPHILS: 56 % (ref 43–77)
SEGMENTED NEUTROPHILS ABSOLUTE COUNT: 1.24 K/UL (ref 1.5–8.1)
SODIUM BLD-SCNC: 139 MMOL/L (ref 135–144)
SPECIFIC GRAVITY UA: 1.03 (ref 1.01–1.02)
TOTAL PROTEIN: 7.2 G/DL (ref 6.4–8.3)
TRICHOMONAS: ABNORMAL
TURBIDITY: ABNORMAL
URINE HGB: ABNORMAL
UROBILINOGEN, URINE: NORMAL
WBC # BLD: 2.2 K/UL (ref 3.5–11.3)
WBC # BLD: ABNORMAL 10*3/UL
WBC UA: ABNORMAL /HPF (ref 0–4)
YEAST: ABNORMAL

## 2021-02-10 PROCEDURE — 87086 URINE CULTURE/COLONY COUNT: CPT

## 2021-02-10 PROCEDURE — 6360000004 HC RX CONTRAST MEDICATION: Performed by: EMERGENCY MEDICINE

## 2021-02-10 PROCEDURE — 36415 COLL VENOUS BLD VENIPUNCTURE: CPT

## 2021-02-10 PROCEDURE — 83690 ASSAY OF LIPASE: CPT

## 2021-02-10 PROCEDURE — 85025 COMPLETE CBC W/AUTO DIFF WBC: CPT

## 2021-02-10 PROCEDURE — 81001 URINALYSIS AUTO W/SCOPE: CPT

## 2021-02-10 PROCEDURE — 80053 COMPREHEN METABOLIC PANEL: CPT

## 2021-02-10 PROCEDURE — 84703 CHORIONIC GONADOTROPIN ASSAY: CPT

## 2021-02-10 PROCEDURE — 99285 EMERGENCY DEPT VISIT HI MDM: CPT

## 2021-02-10 PROCEDURE — 74018 RADEX ABDOMEN 1 VIEW: CPT

## 2021-02-10 PROCEDURE — 2709999900 CT ABDOMEN PELVIS W IV CONTRAST

## 2021-02-10 PROCEDURE — 86403 PARTICLE AGGLUT ANTBDY SCRN: CPT

## 2021-02-10 PROCEDURE — 96374 THER/PROPH/DIAG INJ IV PUSH: CPT

## 2021-02-10 PROCEDURE — 6370000000 HC RX 637 (ALT 250 FOR IP): Performed by: EMERGENCY MEDICINE

## 2021-02-10 PROCEDURE — 6360000002 HC RX W HCPCS: Performed by: EMERGENCY MEDICINE

## 2021-02-10 RX ORDER — AMLODIPINE BESYLATE 2.5 MG/1
2.5 TABLET ORAL DAILY
COMMUNITY
Start: 2021-02-01

## 2021-02-10 RX ORDER — OXYCODONE HYDROCHLORIDE AND ACETAMINOPHEN 5; 325 MG/1; MG/1
1 TABLET ORAL EVERY 4 HOURS PRN
COMMUNITY
Start: 2021-01-25 | End: 2021-02-15

## 2021-02-10 RX ORDER — KETOROLAC TROMETHAMINE 30 MG/ML
30 INJECTION, SOLUTION INTRAMUSCULAR; INTRAVENOUS ONCE
Status: COMPLETED | OUTPATIENT
Start: 2021-02-10 | End: 2021-02-10

## 2021-02-10 RX ORDER — CEPHALEXIN 500 MG/1
500 CAPSULE ORAL 3 TIMES DAILY
Qty: 21 CAPSULE | Refills: 0 | Status: SHIPPED | OUTPATIENT
Start: 2021-02-10 | End: 2021-02-17

## 2021-02-10 RX ORDER — DOCUSATE SODIUM 100 MG/1
100 CAPSULE, LIQUID FILLED ORAL 2 TIMES DAILY
Qty: 30 CAPSULE | Refills: 0 | Status: SHIPPED | OUTPATIENT
Start: 2021-02-10

## 2021-02-10 RX ADMIN — MAGNESIUM CITRATE 296 ML: 1.75 LIQUID ORAL at 09:43

## 2021-02-10 RX ADMIN — KETOROLAC TROMETHAMINE 30 MG: 30 INJECTION, SOLUTION INTRAMUSCULAR at 08:21

## 2021-02-10 RX ADMIN — IOPAMIDOL 100 ML: 755 INJECTION, SOLUTION INTRAVENOUS at 08:54

## 2021-02-10 ASSESSMENT — PAIN DESCRIPTION - PROGRESSION: CLINICAL_PROGRESSION: GRADUALLY WORSENING

## 2021-02-10 ASSESSMENT — PAIN DESCRIPTION - ONSET: ONSET: ON-GOING

## 2021-02-10 ASSESSMENT — PAIN DESCRIPTION - ORIENTATION: ORIENTATION: LEFT

## 2021-02-10 ASSESSMENT — PAIN SCALES - GENERAL: PAINLEVEL_OUTOF10: 9

## 2021-02-10 ASSESSMENT — PAIN DESCRIPTION - PAIN TYPE: TYPE: ACUTE PAIN

## 2021-02-10 ASSESSMENT — PAIN - FUNCTIONAL ASSESSMENT: PAIN_FUNCTIONAL_ASSESSMENT: ACTIVITIES ARE NOT PREVENTED

## 2021-02-10 ASSESSMENT — PAIN DESCRIPTION - FREQUENCY: FREQUENCY: CONTINUOUS

## 2021-02-10 NOTE — ED PROVIDER NOTES
eMERGENCY dEPARTMENT eNCOUnter      Pt Name: Chase Pollard  MRN: [id number]  [place] 1990  Date of evaluation: 2/10/2021      CHIEF COMPLAINT       Chief Complaint   Patient presents with    Constipation         HISTORY OF PRESENT ILLNESS    Chase Pollard is a 35 y.o. female who presents with constipation. Patient states she has not had a bowel movement at least a couple of days she states she feels like she has to have 1 feels counterpressure down in her rectal region. She has a known history of cervical cancer always has chronic left-sided abdominal pain that is also still present she denies associated nausea vomiting fever chills she denies any exacerbating or relieving factors however she states it is worse when she tries to the leg back        REVIEW OF SYSTEMS       Review of systems are all reviewed and negative except stated above in HPI    [address]    has a past medical history of Chlamydia, Endometriosis, Hypertension, and Hypokalemia. SURGICAL HISTORY      has a past surgical history that includes Gallbladder surgery and Cholecystectomy.     CURRENT MEDICATIONS       Previous Medications    AMLODIPINE (NORVASC) 10 MG TABLET    Take 1 tablet by mouth nightly    AMLODIPINE (NORVASC) 2.5 MG TABLET    Take 2.5 mg by mouth daily    BLACK ELDERBERRY (SAMBUCUS ELDERBERRY PO)    Take by mouth    CALCIUM POLYCARBOPHIL (FIBER-CAPS PO)    Take by mouth    CYCLOBENZAPRINE (FLEXERIL) 10 MG TABLET    Take 10 mg by mouth 3 times daily as needed for Muscle spasms    DEXAMETHASONE (DECADRON) 4 MG TABLET    Take 4 mg by mouth 2 times daily (with meals) Takes 4mg daily on days 4, 5, 6 of chemo    FERROUS SULFATE 325 (65 FE) MG TABLET    Take 1 tablet by mouth daily    INSULIN GLARGINE (LANTUS) 100 UNIT/ML INJECTION VIAL    Inject 40 Units into the skin nightly    INSULIN LISPRO (HUMALOG) 100 UNIT/ML INJECTION VIAL    Inject 13 Units into the skin 3 times daily (with meals)    INSULIN LISPRO (HUMALOG) 100 UNIT/ML INJECTION VIAL    Inject 0-3 Units into the skin nightly    INSULIN LISPRO (HUMALOG) 100 UNIT/ML INJECTION VIAL    Inject 0-6 Units into the skin 3 times daily (with meals)    LACTOBACILLUS (CULTURELLE) CAPS CAPSULE    Take 1 capsule by mouth 3 times daily    LORAZEPAM (ATIVAN) 1 MG TABLET    Take 1 mg by mouth every 6 hours as needed for Anxiety. LOSARTAN (COZAAR) 100 MG TABLET    Take 100 mg by mouth    METOPROLOL TARTRATE (LOPRESSOR) 50 MG TABLET    Take 1 tablet by mouth 2 times daily    NICOTINE (NICODERM CQ) 21 MG/24HR    Place 1 patch onto the skin daily    ONDANSETRON (ZOFRAN) 4 MG TABLET    Take 1 tablet by mouth every 8 hours as needed for Nausea    OXYCODONE-ACETAMINOPHEN (PERCOCET) 5-325 MG PER TABLET    Take 1 tablet by mouth every 4 hours as needed. POTASSIUM CHLORIDE (KLOR-CON M) 20 MEQ EXTENDED RELEASE TABLET    Take 1 tablet by mouth daily    PREDNISONE (DELTASONE) 20 MG TABLET    Take 2 tablets by mouth every morning till gone. PROCHLORPERAZINE (COMPAZINE) 10 MG TABLET    Take 10 mg by mouth every 6 hours as needed (mild nausea or vomiting)    SPIRONOLACTONE-HYDROCHLOROTHIAZIDE (ALDACTAZIDE) 25-25 MG PER TABLET    Take 1 tablet by mouth daily       ALLERGIES     is allergic to gentamicin. FAMILY HISTORY     has no family status information on file. family history is not on file. SOCIAL HISTORY      reports that she has been smoking. She has been smoking about 1.00 pack per day. She has never used smokeless tobacco. She reports previous alcohol use. She reports that she does not use drugs. PHYSICAL EXAM     INITIAL VITALS:  weight is 300 lb (136.1 kg). Her tympanic temperature is 97.8 °F (36.6 °C). Her blood pressure is 170/87 (abnormal) and her pulse is 104. Her respiration is 14 and oxygen saturation is 97%.       General: Patient is a morbidly obese white female who appears comfortable  HEENT: Head is atraumatic conjunctiva are clear  Respiratory: Lung sounds are clear bilateral  Cardiac: Heart is regular rate and rhythm  GI: Abdomen is morbidly obese soft nontender palpation generally although she does complain that he has a little bit of pain diffusely no rebound no guarding bowel sounds are normal nonsurgical exam  Neuro: Patient has no gross focal neurological deficits at bedside exam    DIFFERENTIAL DIAGNOSIS/ MDM:     Abdominal pain constipation bowel obstruction metastatic cancer    DIAGNOSTIC RESULTS     EKG: All EKG's are interpreted by the Emergency Department Physician who either signs or Co-signs this chart in the absence of a cardiologist.        RADIOLOGY:   I directly visualized the following  images and reviewed the radiologist interpretations:  XR ABDOMEN (KUB) (SINGLE AP VIEW)    (Results Pending)         LABS:  Labs Reviewed - No data to display      EMERGENCY DEPARTMENT COURSE:   Vitals:    Vitals:    02/10/21 0710 02/10/21 0720   BP: (!) 190/111 (!) 170/87   Pulse: 116 104   Resp:  14   Temp: 97.8 °F (36.6 °C)    TempSrc: Tympanic    SpO2:  97%   Weight: 300 lb (136.1 kg)      -------------------------  BP: (!) 170/87, Temp: 97.8 °F (36.6 °C), Pulse: 104, Resp: 14    No orders of the defined types were placed in this encounter. Re-evaluation Notes    At this time care will be turned over to oncoming physician Dr. Terra Eller secondary to the end of my shift for final disposition and diagnosis    CRITICAL CARE:   None      CONSULTS:      PROCEDURES:  None    FINAL IMPRESSION    No diagnosis found. DISPOSITION/PLAN   DISPOSITION        Condition on Disposition        PATIENT REFERRED TO:  No follow-up provider specified. DISCHARGE MEDICATIONS:  New Prescriptions    No medications on file       (Please note that portions of this note were completed with a voice recognition program.  Efforts were made to edit the dictations but occasionally words are mis-transcribed.)    [place] MD, F.A.C.E.P.   Attending Emergency Physician

## 2021-02-10 NOTE — ED PROVIDER NOTES
ADDENDUM:      Care of this patient was assumed from Dr Jenny Husain. The patient was seen for Constipation  . The patient's initial evaluation and plan have been discussed with the prior provider who initially evaluated the patient. Nursing Notes, Past Medical Hx, Past Surgical Hx, Social Hx, Allergies, and Family Hx were all reviewed. Diagnostic Results     EKG: All EKG's are interpreted by the Emergency Department Physician who either signs or Co-signs this chart in the absence of a cardiologist.        RADIOLOGY:All plain film, CT, MRI, and formal ultrasound images (except ED bedside ultrasound) are read by the radiologist and the images and interpretations are reviewed by the emergency physician. Studies:   CT ABDOMEN PELVIS W IV CONTRAST   Final Result   Induration is demonstrated of pelvic fat, likely reflecting post radiation   change given patient history of such. Mild mural thickening of the rectum   and fatty induration about the bladder can reflect post radiation change. Proctitis and cystitis respectively can also have this appearance in the   appropriate clinical setting. 2 left periaortic lymph nodes have increased in size as compared to prior and   an additional nodule or node has developed adjacent to the left adrenal   gland. As poncho metastatic disease cannot be excluded, continued attention   on CT follow-up is recommended. PET-CT could also be considered for further   assessment.          XR ABDOMEN (KUB) (SINGLE AP VIEW)   Final Result   Addendum 1 of 1   ADDENDUM:   .         Final          LABS:   Labs Reviewed   CBC WITH AUTO DIFFERENTIAL - Abnormal; Notable for the following components:       Result Value    WBC 2.2 (*)     Hemoglobin 10.7 (*)     RDW 16.5 (*)     Monocytes 17 (*)     Immature Granulocytes 3 (*)     Absolute Lymph # 0.48 (*)     Segs Absolute 1.24 (*)     All other components within normal limits   COMPREHENSIVE METABOLIC PANEL - Abnormal; Notable for the capsule by mouth 2 times daily       DISPOSITION: Discharged in stable condition  Abiel Whitlock MD  (Please note that portions of this note were completed with a voice recognition program.  Efforts were made to edit the dictations but occasionally words are mis-transcribed.)      Abiel Whitlock MD  02/10/21 6864

## 2021-02-11 LAB
CULTURE: ABNORMAL
Lab: ABNORMAL
SPECIMEN DESCRIPTION: ABNORMAL

## 2022-04-01 NOTE — ED PROVIDER NOTES
Hypertension. SURGICAL HISTORY      has a past surgical history that includes Gallbladder surgery and Cholecystectomy. CURRENT MEDICATIONS       Previous Medications    AMLODIPINE (NORVASC) 5 MG TABLET    Take 5 mg by mouth    BLACK ELDERBERRY (SAMBUCUS ELDERBERRY PO)    Take by mouth    CALCIUM POLYCARBOPHIL (FIBER-CAPS PO)    Take by mouth    CYCLOBENZAPRINE (FLEXERIL) 10 MG TABLET    Take 10 mg by mouth 3 times daily as needed for Muscle spasms    DEXAMETHASONE (DECADRON) 4 MG TABLET    Take 4 mg by mouth 2 times daily (with meals) Takes 4mg daily on days 4, 5, 6 of chemo    HYDROCHLOROTHIAZIDE (MICROZIDE) 12.5 MG CAPSULE    Take 12.5 mg by mouth    LORAZEPAM (ATIVAN) 1 MG TABLET    Take 1 mg by mouth every 6 hours as needed for Anxiety. LOSARTAN (COZAAR) 100 MG TABLET    Take 100 mg by mouth    METOPROLOL TARTRATE (LOPRESSOR) 25 MG TABLET    Take 25 mg by mouth 2 times daily    ONDANSETRON (ZOFRAN ODT) 4 MG DISINTEGRATING TABLET    Take 1 tablet by mouth every 8 hours as needed for Nausea    ONDANSETRON (ZOFRAN) 4 MG TABLET    Take 1 tablet by mouth every 8 hours as needed for Nausea    POTASSIUM CHLORIDE (KLOR-CON M) 20 MEQ EXTENDED RELEASE TABLET    Take 1 tablet by mouth daily    PROCHLORPERAZINE (COMPAZINE) 10 MG TABLET    Take 10 mg by mouth every 6 hours as needed (mild nausea or vomiting)    SPIRONOLACTONE-HYDROCHLOROTHIAZIDE (ALDACTAZIDE) 25-25 MG PER TABLET    Take 1 tablet by mouth daily    TRAMADOL (ULTRAM) 50 MG TABLET    Take 50 mg by mouth every 6 hours as needed for Pain. ALLERGIES     is allergic to gentamicin. FAMILY HISTORY     has no family status information on file. family history is not on file. SOCIAL HISTORY      reports that she has been smoking. She has been smoking about 1.00 pack per day. She has never used smokeless tobacco. She reports previous alcohol use. She reports that she does not use drugs.     PHYSICAL EXAM     INITIAL VITALS:  height is 5' 7\" (1.702 m) and weight is 305 lb (138.3 kg) (abnormal). Her tympanic temperature is 97 °F (36.1 °C). Her blood pressure is 150/73 (abnormal) and her pulse is 112. Her respiration is 14 and oxygen saturation is 99%. Physical Exam  Constitutional:       Appearance: She is well-developed. She is obese. HENT:      Head: Normocephalic and atraumatic. Eyes:      Conjunctiva/sclera: Conjunctivae normal.      Pupils: Pupils are equal, round, and reactive to light. Neck:      Musculoskeletal: Normal range of motion. Cardiovascular:      Rate and Rhythm: Regular rhythm. Tachycardia present. Pulmonary:      Effort: Pulmonary effort is normal.      Breath sounds: Normal breath sounds. Abdominal:      General: Bowel sounds are normal. There is no distension. Palpations: Abdomen is soft. Tenderness: There is no tenderness. There is no guarding. Musculoskeletal: Normal range of motion. General: No tenderness. Skin:     General: Skin is warm and dry. Neurological:      General: No focal deficit present. Mental Status: She is alert and oriented to person, place, and time.    Psychiatric:         Behavior: Behavior normal.           DIFFERENTIAL DIAGNOSIS/ MDM:     Dizziness and lightheadedness will do a work-up treated with IV fluids check labs    DIAGNOSTIC RESULTS     EKG: All EKG's are interpreted by the Emergency Department Physician who either signs or Co-signs this chart in the absence of a cardiologist.  Sinus tachycardia rate of 125 bpm AK interval is 130 ms QRS duration 78 ms QT corrected 424 ms axis is 25 she is got sinus tachycardia with large P waves and some LVH there is no acute ST elevation or depression is no old EKG to compare with      RADIOLOGY:   I directly visualized the following  images and reviewed the radiologist interpretations:          ED BEDSIDE ULTRASOUND:       LABS:  Labs Reviewed   CBC WITH AUTO DIFFERENTIAL - Abnormal; Notable for the following components: Result Value    RBC 5.17 (*)     Hemoglobin 11.2 (*)     MCV 77.4 (*)     MCH 21.7 (*)     RDW 19.3 (*)     Platelets 996 (*)     Monocytes 1 (*)     Lymphocytes 9 (*)     Seg Neutrophils 83 (*)     Eosinophils % 0 (*)     Immature Granulocytes 6 (*)     Absolute Mono # 0.04 (*)     Absolute Lymph # 0.38 (*)     All other components within normal limits   COMPREHENSIVE METABOLIC PANEL - Abnormal; Notable for the following components:    Glucose 346 (*)     BUN 24 (*)     Bun/Cre Ratio 38 (*)     Potassium 3.3 (*)     Chloride 93 (*)     Anion Gap 19 (*)     ALT 52 (*)     Total Protein 6.0 (*)     All other components within normal limits   URINALYSIS - Abnormal; Notable for the following components:    Glucose, Ur 3+ (*)     Ketones, Urine TRACE (*)     Urine Hgb 3+ (*)     Protein, UA TRACE (*)     Leukocyte Esterase, Urine 1+ (*)     All other components within normal limits   MICROSCOPIC URINALYSIS - Abnormal; Notable for the following components:    Bacteria, UA 4+ (*)     Amorphous, UA 3+ (*)     All other components within normal limits   LACTIC ACID - Abnormal; Notable for the following components:    Lactic Acid 4.6 (*)     All other components within normal limits   POC GLUCOSE FINGERSTICK - Abnormal; Notable for the following components:    POC Glucose 230 (*)     All other components within normal limits   POC GLUCOSE FINGERSTICK - Abnormal; Notable for the following components:    POC Glucose 210 (*)     All other components within normal limits   CULTURE BLOOD #1   CULTURE BLOOD #1   HCG, SERUM, QUALITATIVE   LACTIC ACID   POCT GLUCOSE       Glucose improving, elevated lactic acid evidence of urinary tract infection    EMERGENCY DEPARTMENT COURSE:   Vitals:    Vitals:    01/06/20 0953 01/06/20 1144 01/06/20 1345 01/06/20 1507   BP: 133/89 (!) 147/89 (!) 143/97 (!) 150/73   Pulse: 113 112 113 112   Resp: 16 16 16 14   Temp:       TempSrc:       SpO2: 97% 97% 98% 99%   Weight:       Height: 11